# Patient Record
Sex: FEMALE | Employment: OTHER | URBAN - METROPOLITAN AREA
[De-identification: names, ages, dates, MRNs, and addresses within clinical notes are randomized per-mention and may not be internally consistent; named-entity substitution may affect disease eponyms.]

---

## 2021-12-07 ENCOUNTER — LAB VISIT (OUTPATIENT)
Dept: LAB | Facility: HOSPITAL | Age: 81
End: 2021-12-07
Attending: FAMILY MEDICINE
Payer: MEDICARE

## 2021-12-07 ENCOUNTER — OFFICE VISIT (OUTPATIENT)
Dept: FAMILY MEDICINE | Facility: CLINIC | Age: 81
End: 2021-12-07
Payer: MEDICARE

## 2021-12-07 VITALS
WEIGHT: 181.88 LBS | RESPIRATION RATE: 18 BRPM | BODY MASS INDEX: 33.47 KG/M2 | OXYGEN SATURATION: 96 % | HEIGHT: 62 IN | TEMPERATURE: 98 F | SYSTOLIC BLOOD PRESSURE: 128 MMHG | HEART RATE: 106 BPM | DIASTOLIC BLOOD PRESSURE: 74 MMHG

## 2021-12-07 DIAGNOSIS — Z11.59 NEED FOR HEPATITIS C SCREENING TEST: ICD-10-CM

## 2021-12-07 DIAGNOSIS — M25.512 CHRONIC LEFT SHOULDER PAIN: Primary | ICD-10-CM

## 2021-12-07 DIAGNOSIS — E03.9 ACQUIRED HYPOTHYROIDISM: ICD-10-CM

## 2021-12-07 DIAGNOSIS — K21.9 GASTROESOPHAGEAL REFLUX DISEASE WITHOUT ESOPHAGITIS: ICD-10-CM

## 2021-12-07 DIAGNOSIS — Z12.31 ENCOUNTER FOR SCREENING MAMMOGRAM FOR MALIGNANT NEOPLASM OF BREAST: ICD-10-CM

## 2021-12-07 DIAGNOSIS — R35.1 NOCTURIA: ICD-10-CM

## 2021-12-07 DIAGNOSIS — F41.1 GENERALIZED ANXIETY DISORDER: ICD-10-CM

## 2021-12-07 DIAGNOSIS — Z23 NEED FOR VACCINATION: ICD-10-CM

## 2021-12-07 DIAGNOSIS — F33.1 MODERATE EPISODE OF RECURRENT MAJOR DEPRESSIVE DISORDER: ICD-10-CM

## 2021-12-07 DIAGNOSIS — Z78.0 POSTMENOPAUSAL: ICD-10-CM

## 2021-12-07 DIAGNOSIS — E78.2 MIXED HYPERLIPIDEMIA: ICD-10-CM

## 2021-12-07 DIAGNOSIS — G89.29 CHRONIC LEFT SHOULDER PAIN: Primary | ICD-10-CM

## 2021-12-07 DIAGNOSIS — G47.00 INSOMNIA, UNSPECIFIED TYPE: ICD-10-CM

## 2021-12-07 LAB
BILIRUB UR QL STRIP: NEGATIVE
CLARITY UR: CLEAR
COLOR UR: YELLOW
GLUCOSE UR QL STRIP: NEGATIVE
HGB UR QL STRIP: NEGATIVE
HYALINE CASTS #/AREA URNS LPF: 4 /LPF
KETONES UR QL STRIP: NEGATIVE
LEUKOCYTE ESTERASE UR QL STRIP: ABNORMAL
MICROSCOPIC COMMENT: ABNORMAL
NITRITE UR QL STRIP: NEGATIVE
NON-SQ EPI CELLS #/AREA URNS HPF: 0 /HPF
PH UR STRIP: 7 [PH] (ref 5–8)
PROT UR QL STRIP: NEGATIVE
RBC #/AREA URNS HPF: 2 /HPF (ref 0–4)
SP GR UR STRIP: 1.01 (ref 1–1.03)
SQUAMOUS #/AREA URNS HPF: 1 /HPF
URN SPEC COLLECT METH UR: ABNORMAL
UROBILINOGEN UR STRIP-ACNC: NEGATIVE EU/DL
WBC #/AREA URNS HPF: 4 /HPF (ref 0–5)
WBC CLUMPS URNS QL MICRO: ABNORMAL

## 2021-12-07 PROCEDURE — 3288F FALL RISK ASSESSMENT DOCD: CPT | Mod: CPTII,S$GLB,, | Performed by: FAMILY MEDICINE

## 2021-12-07 PROCEDURE — G0008 FLU VACCINE - QUADRIVALENT - ADJUVANTED: ICD-10-PCS | Mod: S$GLB,,, | Performed by: FAMILY MEDICINE

## 2021-12-07 PROCEDURE — 99204 OFFICE O/P NEW MOD 45 MIN: CPT | Mod: S$GLB,,, | Performed by: FAMILY MEDICINE

## 2021-12-07 PROCEDURE — 3078F DIAST BP <80 MM HG: CPT | Mod: CPTII,S$GLB,, | Performed by: FAMILY MEDICINE

## 2021-12-07 PROCEDURE — 1101F PR PT FALLS ASSESS DOC 0-1 FALLS W/OUT INJ PAST YR: ICD-10-PCS | Mod: CPTII,S$GLB,, | Performed by: FAMILY MEDICINE

## 2021-12-07 PROCEDURE — G0008 ADMIN INFLUENZA VIRUS VAC: HCPCS | Mod: S$GLB,,, | Performed by: FAMILY MEDICINE

## 2021-12-07 PROCEDURE — 99999 PR PBB SHADOW E&M-EST. PATIENT-LVL IV: ICD-10-PCS | Mod: PBBFAC,,, | Performed by: FAMILY MEDICINE

## 2021-12-07 PROCEDURE — 81000 URINALYSIS NONAUTO W/SCOPE: CPT | Performed by: FAMILY MEDICINE

## 2021-12-07 PROCEDURE — 87086 URINE CULTURE/COLONY COUNT: CPT | Performed by: FAMILY MEDICINE

## 2021-12-07 PROCEDURE — 99204 PR OFFICE/OUTPT VISIT, NEW, LEVL IV, 45-59 MIN: ICD-10-PCS | Mod: S$GLB,,, | Performed by: FAMILY MEDICINE

## 2021-12-07 PROCEDURE — 90694 VACC AIIV4 NO PRSRV 0.5ML IM: CPT | Mod: S$GLB,,, | Performed by: FAMILY MEDICINE

## 2021-12-07 PROCEDURE — 3288F PR FALLS RISK ASSESSMENT DOCUMENTED: ICD-10-PCS | Mod: CPTII,S$GLB,, | Performed by: FAMILY MEDICINE

## 2021-12-07 PROCEDURE — 90694 FLU VACCINE - QUADRIVALENT - ADJUVANTED: ICD-10-PCS | Mod: S$GLB,,, | Performed by: FAMILY MEDICINE

## 2021-12-07 PROCEDURE — 1101F PT FALLS ASSESS-DOCD LE1/YR: CPT | Mod: CPTII,S$GLB,, | Performed by: FAMILY MEDICINE

## 2021-12-07 PROCEDURE — 3074F SYST BP LT 130 MM HG: CPT | Mod: CPTII,S$GLB,, | Performed by: FAMILY MEDICINE

## 2021-12-07 PROCEDURE — 3078F PR MOST RECENT DIASTOLIC BLOOD PRESSURE < 80 MM HG: ICD-10-PCS | Mod: CPTII,S$GLB,, | Performed by: FAMILY MEDICINE

## 2021-12-07 PROCEDURE — 99999 PR PBB SHADOW E&M-EST. PATIENT-LVL IV: CPT | Mod: PBBFAC,,, | Performed by: FAMILY MEDICINE

## 2021-12-07 PROCEDURE — 3074F PR MOST RECENT SYSTOLIC BLOOD PRESSURE < 130 MM HG: ICD-10-PCS | Mod: CPTII,S$GLB,, | Performed by: FAMILY MEDICINE

## 2021-12-07 RX ORDER — DICLOFENAC SODIUM 10 MG/G
GEL TOPICAL
Qty: 100 G | Refills: 5 | Status: SHIPPED | OUTPATIENT
Start: 2021-12-07 | End: 2022-09-21 | Stop reason: SDUPTHER

## 2021-12-07 RX ORDER — LEVOTHYROXINE SODIUM 100 UG/1
100 TABLET ORAL
COMMUNITY
End: 2021-12-28 | Stop reason: SDUPTHER

## 2021-12-07 RX ORDER — ATORVASTATIN CALCIUM 10 MG/1
10 TABLET, FILM COATED ORAL DAILY
COMMUNITY
End: 2021-12-28 | Stop reason: SDUPTHER

## 2021-12-07 RX ORDER — ALPRAZOLAM 0.5 MG/1
0.5 TABLET ORAL 2 TIMES DAILY
COMMUNITY
End: 2021-12-28

## 2021-12-07 RX ORDER — NORTRIPTYLINE HYDROCHLORIDE 50 MG/1
50 CAPSULE ORAL NIGHTLY
COMMUNITY
End: 2021-12-28 | Stop reason: SDUPTHER

## 2021-12-07 RX ORDER — MIRTAZAPINE 7.5 MG/1
7.5 TABLET, FILM COATED ORAL NIGHTLY
Qty: 30 TABLET | Refills: 1 | Status: SHIPPED | OUTPATIENT
Start: 2021-12-07 | End: 2021-12-28 | Stop reason: SDUPTHER

## 2021-12-07 RX ORDER — OMEPRAZOLE 40 MG/1
40 CAPSULE, DELAYED RELEASE ORAL DAILY
COMMUNITY
End: 2021-12-28 | Stop reason: SDUPTHER

## 2021-12-07 RX ORDER — DULOXETIN HYDROCHLORIDE 30 MG/1
30 CAPSULE, DELAYED RELEASE ORAL DAILY
COMMUNITY
End: 2021-12-28 | Stop reason: SDUPTHER

## 2021-12-07 NOTE — PROGRESS NOTES
Patient given flu vaccine to left deltoid, no complaints or reactions noted. Vis given 8/6/21 to patient (Pashto)

## 2021-12-09 ENCOUNTER — HOSPITAL ENCOUNTER (OUTPATIENT)
Dept: RADIOLOGY | Facility: CLINIC | Age: 81
Discharge: HOME | End: 2021-12-09
Attending: FAMILY MEDICINE
Payer: MEDICARE

## 2021-12-09 DIAGNOSIS — Z78.0 POSTMENOPAUSAL: ICD-10-CM

## 2021-12-09 LAB — BACTERIA UR CULT: NORMAL

## 2021-12-09 PROCEDURE — 77080 DEXA BONE DENSITY SPINE HIP: ICD-10-PCS | Mod: 26,,, | Performed by: INTERNAL MEDICINE

## 2021-12-09 PROCEDURE — 77080 DXA BONE DENSITY AXIAL: CPT | Mod: 26,,, | Performed by: INTERNAL MEDICINE

## 2021-12-09 PROCEDURE — 77080 DXA BONE DENSITY AXIAL: CPT | Mod: TC,PO

## 2021-12-28 ENCOUNTER — OFFICE VISIT (OUTPATIENT)
Dept: FAMILY MEDICINE | Facility: CLINIC | Age: 81
End: 2021-12-28
Payer: MEDICARE

## 2021-12-28 VITALS
BODY MASS INDEX: 33.71 KG/M2 | SYSTOLIC BLOOD PRESSURE: 94 MMHG | OXYGEN SATURATION: 95 % | HEIGHT: 62 IN | TEMPERATURE: 98 F | WEIGHT: 183.19 LBS | HEART RATE: 95 BPM | DIASTOLIC BLOOD PRESSURE: 62 MMHG | RESPIRATION RATE: 18 BRPM

## 2021-12-28 DIAGNOSIS — M75.102 TEAR OF LEFT ROTATOR CUFF, UNSPECIFIED TEAR EXTENT, UNSPECIFIED WHETHER TRAUMATIC: ICD-10-CM

## 2021-12-28 DIAGNOSIS — G47.00 INSOMNIA, UNSPECIFIED TYPE: ICD-10-CM

## 2021-12-28 DIAGNOSIS — F33.1 MODERATE EPISODE OF RECURRENT MAJOR DEPRESSIVE DISORDER: ICD-10-CM

## 2021-12-28 DIAGNOSIS — M85.89 OSTEOPENIA OF MULTIPLE SITES: Primary | ICD-10-CM

## 2021-12-28 DIAGNOSIS — F41.1 GENERALIZED ANXIETY DISORDER: ICD-10-CM

## 2021-12-28 DIAGNOSIS — E03.9 ACQUIRED HYPOTHYROIDISM: ICD-10-CM

## 2021-12-28 DIAGNOSIS — K21.9 GASTROESOPHAGEAL REFLUX DISEASE, UNSPECIFIED WHETHER ESOPHAGITIS PRESENT: ICD-10-CM

## 2021-12-28 DIAGNOSIS — E78.5 DYSLIPIDEMIA: ICD-10-CM

## 2021-12-28 DIAGNOSIS — M19.012 ARTHROSIS OF LEFT SHOULDER: ICD-10-CM

## 2021-12-28 DIAGNOSIS — L85.3 DRY SKIN: ICD-10-CM

## 2021-12-28 PROCEDURE — 3074F PR MOST RECENT SYSTOLIC BLOOD PRESSURE < 130 MM HG: ICD-10-PCS | Mod: CPTII,S$GLB,, | Performed by: FAMILY MEDICINE

## 2021-12-28 PROCEDURE — 3288F PR FALLS RISK ASSESSMENT DOCUMENTED: ICD-10-PCS | Mod: CPTII,S$GLB,, | Performed by: FAMILY MEDICINE

## 2021-12-28 PROCEDURE — 1101F PT FALLS ASSESS-DOCD LE1/YR: CPT | Mod: CPTII,S$GLB,, | Performed by: FAMILY MEDICINE

## 2021-12-28 PROCEDURE — 99999 PR PBB SHADOW E&M-EST. PATIENT-LVL IV: ICD-10-PCS | Mod: PBBFAC,,, | Performed by: FAMILY MEDICINE

## 2021-12-28 PROCEDURE — 99214 PR OFFICE/OUTPT VISIT, EST, LEVL IV, 30-39 MIN: ICD-10-PCS | Mod: S$GLB,,, | Performed by: FAMILY MEDICINE

## 2021-12-28 PROCEDURE — 3078F PR MOST RECENT DIASTOLIC BLOOD PRESSURE < 80 MM HG: ICD-10-PCS | Mod: CPTII,S$GLB,, | Performed by: FAMILY MEDICINE

## 2021-12-28 PROCEDURE — 99214 OFFICE O/P EST MOD 30 MIN: CPT | Mod: S$GLB,,, | Performed by: FAMILY MEDICINE

## 2021-12-28 PROCEDURE — 3074F SYST BP LT 130 MM HG: CPT | Mod: CPTII,S$GLB,, | Performed by: FAMILY MEDICINE

## 2021-12-28 PROCEDURE — 99999 PR PBB SHADOW E&M-EST. PATIENT-LVL IV: CPT | Mod: PBBFAC,,, | Performed by: FAMILY MEDICINE

## 2021-12-28 PROCEDURE — 1159F MED LIST DOCD IN RCRD: CPT | Mod: CPTII,S$GLB,, | Performed by: FAMILY MEDICINE

## 2021-12-28 PROCEDURE — 3078F DIAST BP <80 MM HG: CPT | Mod: CPTII,S$GLB,, | Performed by: FAMILY MEDICINE

## 2021-12-28 PROCEDURE — 1101F PR PT FALLS ASSESS DOC 0-1 FALLS W/OUT INJ PAST YR: ICD-10-PCS | Mod: CPTII,S$GLB,, | Performed by: FAMILY MEDICINE

## 2021-12-28 PROCEDURE — 1159F PR MEDICATION LIST DOCUMENTED IN MEDICAL RECORD: ICD-10-PCS | Mod: CPTII,S$GLB,, | Performed by: FAMILY MEDICINE

## 2021-12-28 PROCEDURE — 3288F FALL RISK ASSESSMENT DOCD: CPT | Mod: CPTII,S$GLB,, | Performed by: FAMILY MEDICINE

## 2021-12-28 PROCEDURE — 1160F RVW MEDS BY RX/DR IN RCRD: CPT | Mod: CPTII,S$GLB,, | Performed by: FAMILY MEDICINE

## 2021-12-28 PROCEDURE — 1160F PR REVIEW ALL MEDS BY PRESCRIBER/CLIN PHARMACIST DOCUMENTED: ICD-10-PCS | Mod: CPTII,S$GLB,, | Performed by: FAMILY MEDICINE

## 2021-12-28 RX ORDER — CALCIUM CARBONATE/VITAMIN D3 600 MG-20
1 TABLET,CHEWABLE ORAL ONCE
Qty: 180 TABLET | Refills: 3 | Status: SHIPPED | OUTPATIENT
Start: 2021-12-28 | End: 2021-12-28

## 2021-12-28 RX ORDER — ATORVASTATIN CALCIUM 10 MG/1
10 TABLET, FILM COATED ORAL DAILY
Qty: 90 TABLET | Refills: 1 | Status: SHIPPED | OUTPATIENT
Start: 2021-12-28 | End: 2022-03-28 | Stop reason: SDUPTHER

## 2021-12-28 RX ORDER — DULOXETIN HYDROCHLORIDE 30 MG/1
30 CAPSULE, DELAYED RELEASE ORAL DAILY
Qty: 90 CAPSULE | Refills: 0 | Status: SHIPPED | OUTPATIENT
Start: 2021-12-28 | End: 2022-03-28 | Stop reason: SDUPTHER

## 2021-12-28 RX ORDER — MIRTAZAPINE 7.5 MG/1
7.5 TABLET, FILM COATED ORAL NIGHTLY
Qty: 30 TABLET | Refills: 1 | Status: SHIPPED | OUTPATIENT
Start: 2021-12-28 | End: 2022-03-28 | Stop reason: SDUPTHER

## 2021-12-28 RX ORDER — OMEPRAZOLE 40 MG/1
40 CAPSULE, DELAYED RELEASE ORAL DAILY
Qty: 90 CAPSULE | Refills: 0 | Status: SHIPPED | OUTPATIENT
Start: 2021-12-28 | End: 2022-03-28 | Stop reason: SDUPTHER

## 2021-12-28 RX ORDER — LEVOTHYROXINE SODIUM 100 UG/1
100 TABLET ORAL
Qty: 90 TABLET | Refills: 0 | Status: SHIPPED | OUTPATIENT
Start: 2021-12-28 | End: 2022-03-28 | Stop reason: SDUPTHER

## 2021-12-28 RX ORDER — AMMONIUM LACTATE 12 G/100G
LOTION TOPICAL
Qty: 396 G | Refills: 11 | Status: SHIPPED | OUTPATIENT
Start: 2021-12-28 | End: 2022-09-21 | Stop reason: SDUPTHER

## 2021-12-28 RX ORDER — NORTRIPTYLINE HYDROCHLORIDE 50 MG/1
50 CAPSULE ORAL NIGHTLY
Qty: 90 CAPSULE | Refills: 1 | Status: SHIPPED | OUTPATIENT
Start: 2021-12-28 | End: 2022-03-28 | Stop reason: SDUPTHER

## 2021-12-29 ENCOUNTER — HOSPITAL ENCOUNTER (OUTPATIENT)
Dept: RADIOLOGY | Facility: HOSPITAL | Age: 81
Discharge: HOME | End: 2021-12-29
Attending: FAMILY MEDICINE
Payer: MEDICARE

## 2021-12-29 DIAGNOSIS — Z12.31 ENCOUNTER FOR SCREENING MAMMOGRAM FOR MALIGNANT NEOPLASM OF BREAST: ICD-10-CM

## 2021-12-29 PROCEDURE — 77063 BREAST TOMOSYNTHESIS BI: CPT | Mod: 26,,, | Performed by: RADIOLOGY

## 2021-12-29 PROCEDURE — 77067 SCR MAMMO BI INCL CAD: CPT | Mod: 26,,, | Performed by: RADIOLOGY

## 2021-12-29 PROCEDURE — 77063 MAMMO DIGITAL SCREENING BILAT WITH TOMO: ICD-10-PCS | Mod: 26,,, | Performed by: RADIOLOGY

## 2021-12-29 PROCEDURE — 77067 MAMMO DIGITAL SCREENING BILAT WITH TOMO: ICD-10-PCS | Mod: 26,,, | Performed by: RADIOLOGY

## 2021-12-29 PROCEDURE — 77067 SCR MAMMO BI INCL CAD: CPT | Mod: TC,PO

## 2021-12-30 ENCOUNTER — TELEPHONE (OUTPATIENT)
Dept: FAMILY MEDICINE | Facility: CLINIC | Age: 81
End: 2021-12-30
Payer: MEDICARE

## 2022-01-02 NOTE — PROGRESS NOTES
Subjective:       Patient ID: Thad Maki is a 81 y.o. female.    Chief Complaint: Establish Care    Shoulder Pain   The pain is present in the left shoulder. This is a new problem. The current episode started more than 1 month ago. There has been no history of extremity trauma. The problem occurs constantly. The problem has been unchanged. The quality of the pain is described as aching. The pain is at a severity of 9/10. The pain is severe. The symptoms are aggravated by activity and lying down. She has tried nothing for the symptoms. There is no history of Injuries to Extremity.   Anxiety  Presents for initial visit. Symptoms include decreased concentration, depressed mood, excessive worry, insomnia, irritability, nervous/anxious behavior and palpitations. Patient reports no chest pain, feeling of choking, shortness of breath or suicidal ideas. Symptoms occur most days. The severity of symptoms is interfering with daily activities. Nothing aggravates the symptoms. The quality of sleep is poor. Nighttime awakenings: several.     Her past medical history is significant for anxiety/panic attacks. Treatments tried: Cymbalta. Compliance with prior treatments: not taking consistently    Depression  Visit Type: initial  Onset of symptoms: more than 1 year ago  Progression since onset: unchanged  Patient presents with the following symptoms: decreased concentration, depressed mood, excessive worry, fatigue, insomnia, irritability, nervousness/anxiety and palpitations.  Patient is not experiencing: shortness of breath, suicidal ideas, suicidal planning, thoughts of death, weight gain and weight loss.  Frequency of symptoms: most days   Sleep quality: poor  Nighttime awakenings: several  Patient has a history of: anxiety/panic attacks and depression  Treatment tried: non-SSRI antidepressants  Compliance with treatment: poor  Past compliance problems: difficulty understanding directions      Hyperlipidemia  This is a  chronic problem. Pertinent negatives include no chest pain or shortness of breath. Current antihyperlipidemic treatment includes statins. Compliance problems include psychosocial issues.    Thyroid Problem  Presents for initial visit. Symptoms include anxiety, depressed mood and palpitations. Patient reports no weight gain or weight loss. Her past medical history is significant for hyperlipidemia.   Gastroesophageal Reflux  She reports no chest pain. The current episode started more than 1 year ago. The problem occurs frequently. Pertinent negatives include no weight loss. She has tried a PPI for the symptoms.     Review of Systems   Constitutional: Positive for irritability. Negative for weight gain and weight loss.   Respiratory: Negative for shortness of breath.    Cardiovascular: Positive for palpitations. Negative for chest pain.   Psychiatric/Behavioral: Positive for decreased concentration and depression. Negative for suicidal ideas. The patient is nervous/anxious and has insomnia.          Objective:      Physical Exam  Vitals reviewed.   Constitutional:       General: She is not in acute distress.  HENT:      Head: Normocephalic and atraumatic.      Right Ear: Ear canal and external ear normal.      Left Ear: Ear canal and external ear normal.      Nose: Nose normal.      Mouth/Throat:      Mouth: Mucous membranes are moist.      Pharynx: No oropharyngeal exudate or posterior oropharyngeal erythema.   Eyes:      Extraocular Movements: Extraocular movements intact.      Conjunctiva/sclera: Conjunctivae normal.      Pupils: Pupils are equal, round, and reactive to light.   Cardiovascular:      Rate and Rhythm: Regular rhythm. Tachycardia present.      Pulses: Normal pulses.      Heart sounds: Normal heart sounds.   Pulmonary:      Effort: Pulmonary effort is normal. No respiratory distress.      Breath sounds: No wheezing or rales.   Abdominal:      General: Abdomen is flat. Bowel sounds are normal. There is  no distension.      Palpations: Abdomen is soft.      Tenderness: There is no abdominal tenderness. There is no guarding.   Musculoskeletal:      Cervical back: Normal range of motion. No rigidity or tenderness.   Lymphadenopathy:      Cervical: No cervical adenopathy.   Skin:     General: Skin is warm.      Capillary Refill: Capillary refill takes less than 2 seconds.   Neurological:      Mental Status: She is alert and oriented to person, place, and time.      Cranial Nerves: No cranial nerve deficit.      Sensory: No sensory deficit.   Psychiatric:         Mood and Affect: Mood is anxious and depressed. Affect is not inappropriate.         Behavior: Behavior normal.         Thought Content: Thought content normal. Thought content does not include homicidal or suicidal ideation.         Assessment:       Problem List Items Addressed This Visit        Psychiatric    Generalized anxiety disorder    Moderate episode of recurrent major depressive disorder       Cardiac/Vascular    Hyperlipidemia    Relevant Orders    Comprehensive Metabolic Panel (Completed)    Lipid Panel (Completed)    CBC Auto Differential (Completed)       Endocrine    Hypothyroidism    Relevant Orders    CBC Auto Differential (Completed)    TSH (Completed)    T4, Free (Completed)       GI    GERD (gastroesophageal reflux disease)       Other    Insomnia      Other Visit Diagnoses     Chronic left shoulder pain    -  Primary    Relevant Medications    diclofenac sodium (VOLTAREN) 1 % Gel    Other Relevant Orders    X-Ray Shoulder Trauma 3 view Left (Completed)    Nocturia        Relevant Orders    Urinalysis (Completed)    Urine culture (Completed)    Postmenopausal        Relevant Orders    DXA Bone Density Spine And Hip (Completed)    Need for vaccination        Relevant Orders    Influenza (FLUAD) - Quadrivalent (Adjuvanted) *Preferred* (65+) (PF) (Completed)    Need for hepatitis C screening test        Relevant Orders    Hepatitis C Antibody  (Completed)    Encounter for screening mammogram for malignant neoplasm of breast        Relevant Orders    Mammo Digital Screening Bilat w/ [facility] (Completed)          Plan:       Thad was seen today for establish care.    Diagnoses and all orders for this visit:    Chronic left shoulder pain  -     X-Ray Shoulder Trauma 3 view Left; Future  -     diclofenac sodium (VOLTAREN) 1 % Gel; Apply 2 grams to left shoulder QID-PRN for pain  Check Xray  Trial of Voltaren gel  Follow up in 2 weeks    Generalized anxiety disorder/Moderate episode of recurrent major depressive disorder/Insomnia, unspecified type  -     mirtazapine (REMERON) 7.5 MG Tab; Take 1 tablet (7.5 mg total) by mouth every evening.  Discussed correct way of taking Cymbalta.  Will add Remeron and re-evaluate    Mixed hyperlipidemia  -     Comprehensive Metabolic Panel; Future  -     Lipid Panel; Future  -     CBC Auto Differential; Future  Check lipid panel.    Acquired hypothyroidism  -     CBC Auto Differential; Future  -     TSH; Future  -     T4, Free; Future  Check patient's thyroid function.    Nocturia  -     Urinalysis; Future  -     Urine culture; Future  Your studies collected.    Gastroesophageal reflux disease without esophagitis  Dietary recommendations reviewed    Postmenopausal  -     DXA Bone Density Spine And Hip; Future  It was scheduled for bone density.    Need for vaccination  -     Influenza (FLUAD) - Quadrivalent (Adjuvanted) *Preferred* (65+) (PF)    Need for hepatitis C screening test  -     Hepatitis C Antibody; Future    Encounter for screening mammogram for malignant neoplasm of breast  -     Mammo Digital Screening Bilat w/ [facility]; Future  Breast cancer screening ordered

## 2022-01-02 NOTE — PROGRESS NOTES
Subjective:       Patient ID: Thad Maki is a 81 y.o. female.    Chief Complaint: Results    HPI   81-year-old female comes in for follow-up on shoulder pain, hypertension, dyslipidemia, and hypothyroidism.  She is taking her medications as she was instructed to at the last visit.  She continues to have daily left shoulder pain.  She states that there is nothing in particular improves it.  She feels a daily.  Is worsened with activity.  The there is no swelling.  She denies any specific trauma.  The patient also reports itchy dry skin generalized.  This has been going on for over a year.  It is worsened when a the temperature is cold.  She reports that shins she has been taking the Cymbalta and mirtazapine her anxiety and depression are improving.  She also reports improvement with insomnia.    Review of Systems   Constitutional: Negative for unexpected weight change.   HENT: Negative for ear pain and sore throat.    Eyes: Negative for visual disturbance.   Respiratory: Negative for shortness of breath.    Cardiovascular: Negative for chest pain.   Gastrointestinal: Negative for abdominal pain and blood in stool.   Genitourinary: Negative for dysuria and frequency.   Musculoskeletal: Positive for arthralgias.   Integumentary:  Negative for rash.   Neurological: Negative for weakness, numbness and headaches.   Hematological: Negative for adenopathy.   Psychiatric/Behavioral: Negative for suicidal ideas.         Objective:      Physical Exam  Vitals reviewed.   Constitutional:       Appearance: She is well-developed and well-nourished.   HENT:      Head: Normocephalic and atraumatic.      Right Ear: External ear normal.      Left Ear: External ear normal.      Nose: Nose normal.      Mouth/Throat:      Mouth: Oropharynx is clear and moist.      Pharynx: No oropharyngeal exudate.   Eyes:      General:         Right eye: No discharge.         Left eye: No discharge.      Extraocular Movements: EOM normal.       Conjunctiva/sclera: Conjunctivae normal.      Pupils: Pupils are equal, round, and reactive to light.   Neck:      Trachea: No tracheal deviation.   Cardiovascular:      Rate and Rhythm: Normal rate and regular rhythm.      Pulses: Intact distal pulses.      Heart sounds: Normal heart sounds.   Pulmonary:      Effort: Pulmonary effort is normal.      Breath sounds: Normal breath sounds. No wheezing or rales.   Abdominal:      General: Bowel sounds are normal.      Palpations: Abdomen is soft. Abdomen is not rigid. There is no mass.      Tenderness: There is no abdominal tenderness. There is no CVA tenderness or guarding.   Musculoskeletal:      Cervical back: Normal range of motion and neck supple.   Lymphadenopathy:      Cervical: No cervical adenopathy.   Neurological:      Mental Status: She is oriented to person, place, and time.      Sensory: No sensory deficit.      Motor: No atrophy.      Gait: Gait normal.      Deep Tendon Reflexes: Strength normal.      Reflex Scores:       Patellar reflexes are 2+ on the right side and 2+ on the left side.  Psychiatric:         Mood and Affect: Mood and affect normal.         Lab Visit on 12/07/2021   Component Date Value Ref Range Status    Specimen UA 12/07/2021 Urine, Clean Catch   Final    Color, UA 12/07/2021 Yellow  Yellow, Straw, Antonia Final    Appearance, UA 12/07/2021 Clear  Clear Final    pH, UA 12/07/2021 7.0  5.0 - 8.0 Final    Specific [place], UA 12/07/2021 1.015  1.005 - 1.030 Final    Protein, UA 12/07/2021 Negative  Negative Final    Comment: Recommend a 24 hour urine protein or a urine   protein/creatinine ratio if globulin induced proteinuria is  clinically suspected.      Glucose, UA 12/07/2021 Negative  Negative Final    Ketones, UA 12/07/2021 Negative  Negative Final    Bilirubin (UA) 12/07/2021 Negative  Negative Final    Occult Blood UA 12/07/2021 Negative  Negative Final    Nitrite, UA 12/07/2021 Negative  Negative Final     Urobilinogen, UA 12/07/2021 Negative  <2.0 EU/dL Final    Leukocytes, UA 12/07/2021 1+* Negative Final    Urine Culture, Routine 12/07/2021 No significant growth   Final    RBC, UA 12/07/2021 2  0 - 4 /hpf Final    WBC, UA 12/07/2021 4  0 - 5 /hpf Final    WBC Clumps, UA 12/07/2021 Rare  None-Rare Final    Squam Epithel, UA 12/07/2021 1  /hpf Final    Non-Squam Epith 12/07/2021 0  <1/hpf /hpf Final    Hyaline Casts, UA 12/07/2021 4* 0-1/lpf /lpf Final    Microscopic Comment 12/07/2021 SEE COMMENT   Final    Comment: Other formed elements not mentioned in the report are not   present in the microscopic examination.      Lab Visit on 12/07/2021   Component Date Value Ref Range Status    Sodium 12/07/2021 143  136 - 145 mmol/L Final    Potassium 12/07/2021 4.6  3.5 - 5.1 mmol/L Final    Chloride 12/07/2021 105  95 - 110 mmol/L Final    CO2 12/07/2021 28  23 - 29 mmol/L Final    Glucose 12/07/2021 96  70 - 110 mg/dL Final    BUN 12/07/2021 18  8 - 23 mg/dL Final    Creatinine 12/07/2021 0.8  0.5 - 1.4 mg/dL Final    Calcium 12/07/2021 9.7  8.7 - 10.5 mg/dL Final    Total Protein 12/07/2021 7.1  6.0 - 8.4 g/dL Final    Albumin 12/07/2021 4.1  3.5 - 5.2 g/dL Final    Total Bilirubin 12/07/2021 0.2  0.1 - 1.0 mg/dL Final    Comment: For infants and newborns, interpretation of results should be based  on gestational age, weight and in agreement with clinical  observations.    Premature Infant recommended reference ranges:  Up to 24 hours.............<8.0 mg/dL  Up to 48 hours............<12.0 mg/dL  3-5 days..................<15.0 mg/dL  6-29 days.................<15.0 mg/dL      Alkaline Phosphatase 12/07/2021 87  55 - 135 U/L Final    AST 12/07/2021 23  10 - 40 U/L Final    ALT 12/07/2021 15  10 - 44 U/L Final    Anion Gap 12/07/2021 10  8 - 16 mmol/L Final    eGFR if African American 12/07/2021 >60  >60 mL/min/1.73 m^2 Final    eGFR if non African American 12/07/2021 >60  >60 mL/min/1.73 m^2  Final    Comment: Calculation used to obtain the estimated glomerular filtration  rate (eGFR) is the CKD-EPI equation.       Cholesterol 12/07/2021 152  120 - 199 mg/dL Final    Comment: The National Cholesterol Education Program (NCEP) has set the  following guidelines (reference ranges) for Cholesterol:  Optimal.....................<200 mg/dL  Borderline High.............200-239 mg/dL  High........................> or = 240 mg/dL      Triglycerides 12/07/2021 80  30 - 150 mg/dL Final    Comment: The National Cholesterol Education Program (NCEP) has set the  following guidelines (reference values) for triglycerides:  Normal......................<150 mg/dL  Borderline High.............150-199 mg/dL  High........................200-499 mg/dL      HDL 12/07/2021 53  40 - 75 mg/dL Final    Comment: The National Cholesterol Education Program (NCEP) has set the  following guidelines (reference values) for HDL Cholesterol:  Low...............<40 mg/dL  Optimal...........>60 mg/dL      LDL Cholesterol 12/07/2021 83.0  63.0 - 159.0 mg/dL Final    Comment: The National Cholesterol Education Program (NCEP) has set the  following guidelines (reference values) for LDL Cholesterol:  Optimal.......................<130 mg/dL  Borderline High...............130-159 mg/dL  High..........................160-189 mg/dL  Very High.....................>190 mg/dL      HDL/Cholesterol Ratio 12/07/2021 34.9  20.0 - 50.0 % Final    Total Cholesterol/HDL Ratio 12/07/2021 2.9  2.0 - 5.0 Final    Non-HDL Cholesterol 12/07/2021 99  mg/dL Final    Comment: Risk category and Non-HDL cholesterol goals:  Coronary heart disease (CHD)or equivalent (10-year risk of CHD >20%):  Non-HDL cholesterol goal     <130 mg/dL  Two or more CHD risk factors and 10-year risk of CHD <= 20%:  Non-HDL cholesterol goal     <160 mg/dL  0 to 1 CHD risk factor:  Non-HDL cholesterol goal     <190 mg/dL      Hepatitis C Ab 12/07/2021 Negative  Negative Final    WBC  12/07/2021 8.15  3.90 - 12.70 K/uL Final    RBC 12/07/2021 4.89  4.00 - 5.40 M/uL Final    Hemoglobin 12/07/2021 13.3  12.0 - 16.0 g/dL Final    Hematocrit 12/07/2021 42.2  37.0 - 48.5 % Final    MCV 12/07/2021 86  82 - 98 fL Final    MCH 12/07/2021 27.2  27.0 - 31.0 pg Final    MCHC 12/07/2021 31.5* 32.0 - 36.0 g/dL Final    RDW 12/07/2021 15.3* 11.5 - 14.5 % Final    Platelets 12/07/2021 209  150 - 450 K/uL Final    MPV 12/07/2021 12.1  9.2 - 12.9 fL Final    Immature Granulocytes 12/07/2021 0.2  0.0 - 0.5 % Final    Gran # (ANC) 12/07/2021 4.7  1.8 - 7.7 K/uL Final    Immature Grans (Abs) 12/07/2021 0.02  0.00 - 0.04 K/uL Final    Comment: Mild elevation in immature granulocytes is non specific and   can be seen in a variety of conditions including stress response,   acute inflammation, trauma and pregnancy. Correlation with other   laboratory and clinical findings is essential.      Lymph # 12/07/2021 2.5  1.0 - 4.8 K/uL Final    Mono # 12/07/2021 0.6  0.3 - 1.0 K/uL Final    Eos # 12/07/2021 0.3  0.0 - 0.5 K/uL Final    Baso # 12/07/2021 0.02  0.00 - 0.20 K/uL Final    nRBC 12/07/2021 0  0 /100 WBC Final    Gran % 12/07/2021 57.9  38.0 - 73.0 % Final    Lymph % 12/07/2021 30.7  18.0 - 48.0 % Final    Mono % 12/07/2021 7.9  4.0 - 15.0 % Final    Eosinophil % 12/07/2021 3.1  0.0 - 8.0 % Final    Basophil % 12/07/2021 0.2  0.0 - 1.9 % Final    Differential Method 12/07/2021 Automated   Final    TSH 12/07/2021 0.272* 0.400 - 4.000 uIU/mL Final    Free T4 12/07/2021 1.02  0.71 - 1.51 ng/dL Final      Narrative & Impression  EXAMINATION:  XR SHOULDER TRAUMA 3 VIEW LEFT     CLINICAL HISTORY:  . Pain in left shoulder     COMPARISON:  None.     TECHNIQUE:  Multiple views of the left shoulder.     FINDINGS:  Generalized bone demineralization.     Glenohumeral joint: Proximal migration of the humerus touching and eroding the undersurface of the acromion with some secondary arthrosis and cystic  change within the humeral head.     AC joint: Osteoarthrosis.  Acromion type indeterminate.     Regional thoracic structures and surrounding soft tissues: Loose body projects over the spine spinoglenoid notch.     Impression:     Chronic rotator cuff tear with changes of secondary cuff arthropathy and loose body formation        Electronically signed by: Girish Short Jr  Date:                                            12/07/2021  Time:                                           15:40  Narrative & Impression  EXAMINATION:  DEXA BONE DENSITY SPINE HIP     CLINICAL HISTORY:  Asymptomatic menopausal state.  80 y/o female with no history of fractures.  She had menopausal symptoms at 68 y/o.  She is taking Vit D supplements.  She does not exercise or smoke.     TECHNIQUE:  DXA specification: [facility] A ([id number])     Bone Mineral Density scanning was performed over the hip and lumbar spine.     Review of the images confirms satisfactory positioning and technique.     COMPARISON:  No Comparisons     FINDINGS:  Lumbar spine (L1-L4):              BMD is 0.925 g/cm2, T-score is -1.1, and Z-score is 1.5.     Total hip:                                BMD is 0.815 g/cm2, T-score is -1.0, and Z-score is 1.0.     Femoral neck:                          BMD is 0.666 g/cm2, T-score is -1.6, and Z-score is 0.6.     Distal 1/3 radius:                      Not applicable     FRAX:     7.6% risk of a major osteoporotic fracture in the next 10 years.     1.8% risk of hip fracture in the next 10 years.     Impression:     *Low bone mass (Osteopenia); FRAX calculations do not support treatment as osteoporosis.     RECOMMENDATIONS:  *Daily calcium intake 2835-1917 mg, dietary sources preferred; Vitamin D 7290-3162 IU daily.  *Weight bearing exercise and fall precautions.  *Repeat BMD in 2 years.     EXPLANATION OF RESULTS:  T-score compares these results to the average bone density of a 20-29 year-old of the same  gender.     Z-score compares this result to the average bone density to people of the same age, gender, and race.     The amounts indicate the number of standard deviations above or below the mean.     * Osteoporosis is generally defined as having a T-score between less than or equal to -2.5.     * Low bone mass (osteopenia) is generally defined as having a T-score between -1.0 and -2.5.     * The normal range is generally defined as having a T-score greater than or equal to -1.0.     * Calculated FRAX scores for fracture risk prediction may not be accurate in the setting of certain clinical factors such as pharmacologic therapy for osteoporosis, prior fragility fractures, high dose glucocorticoid use.        Electronically signed by: Eda Perez MD  Date:                                            12/15/2021  Time:                                           15:15             Exam Ended: 12/09/21 13:16               Assessment:       Problem List Items Addressed This Visit        Psychiatric    Generalized anxiety disorder    Relevant Medications    mirtazapine (REMERON) 7.5 MG Tab    Moderate episode of recurrent major depressive disorder    Relevant Medications    mirtazapine (REMERON) 7.5 MG Tab    DULoxetine (CYMBALTA) 30 MG capsule       Endocrine    Hypothyroidism    Relevant Medications    levothyroxine (EUTHYROX) 100 MCG tablet    Other Relevant Orders    TSH    T4, Free    Thyroid Peroxidase Antibody       GI    GERD (gastroesophageal reflux disease)    Relevant Medications    omeprazole (PRILOSEC) 40 MG capsule       Other    Insomnia    Relevant Medications    mirtazapine (REMERON) 7.5 MG Tab    nortriptyline (PAMELOR) 50 MG capsule      Other Visit Diagnoses     Osteopenia of multiple sites    -  Primary    Tear of left rotator cuff, unspecified tear extent, unspecified whether traumatic        Relevant Orders    Ambulatory referral/consult to Orthopedics    Arthrosis of left shoulder         Relevant Orders    Ambulatory referral/consult to Orthopedics    Dyslipidemia        Relevant Medications    atorvastatin (LIPITOR) 10 MG tablet    Dry skin        Relevant Medications    ammonium lactate (LAC-HYDRIN) 12 % lotion          Plan:       Thad was seen today for results.    Diagnoses and all orders for this visit:    Osteopenia of multiple sites  -     calcium carbonate-vitamin D3 (CALTRATE 600 PLUS D) 600 mg-20 mcg (800 unit) Chew; Take 1 tablet by mouth once. for 1 dose  Commended patient to start Caltrate twice a day.  Recheck bone density in a few years.  Encouraged weight loss.  Encouraged increased physical activity.    Tear of left rotator cuff, unspecified tear extent, unspecified whether traumatic/Arthrosis of left shoulder  -     Ambulatory referral/consult to Orthopedics; Future  X-ray reviewed with patient  Will refer to Orthopedics.    Moderate episode of recurrent major depressive disorder/Generalozed anxiety disorder/Insomnia  -     mirtazapine (REMERON) 7.5 MG Tab; Take 1 tablet (7.5 mg total) by mouth every evening.  -     DULoxetine (CYMBALTA) 30 MG capsule; Take 1 capsule (30 mg total) by mouth once daily.  Patient heart improvement in symptoms.  Will continue current regimen    Dyslipidemia  -     atorvastatin (LIPITOR) 10 MG tablet; Take 1 tablet (10 mg total) by mouth once daily.  Recommended a atorvastatin 10 milligrams daily.    Acquired hypothyroidism  -     levothyroxine (EUTHYROX) 100 MCG tablet; Take 1 tablet (100 mcg total) by mouth before breakfast.  -     TSH; Future  -     T4, Free; Future  -     Thyroid Peroxidase Antibody; Future  TSH was suppressed with a normal T4.  Will recheck thyroid function in two months.    Gastroesophageal reflux disease, unspecified whether esophagitis present  -     omeprazole (PRILOSEC) 40 MG capsule; Take 1 capsule (40 mg total) by mouth once daily.  Continue Prilosec PRN    Dry skin  -     ammonium lactate (LAC-HYDRIN) 12 % lotion;  Apply topically to itchy areas after bathing once daily  Trial of LacHydrin

## 2022-03-23 ENCOUNTER — LAB VISIT (OUTPATIENT)
Dept: LAB | Facility: HOSPITAL | Age: 82
End: 2022-03-23
Attending: FAMILY MEDICINE
Payer: MEDICARE

## 2022-03-23 DIAGNOSIS — E03.9 ACQUIRED HYPOTHYROIDISM: ICD-10-CM

## 2022-03-23 LAB
T4 FREE SERPL-MCNC: 0.98 NG/DL (ref 0.71–1.51)
THYROPEROXIDASE IGG SERPL-ACNC: <6 IU/ML
TSH SERPL DL<=0.005 MIU/L-ACNC: 0.88 UIU/ML (ref 0.4–4)

## 2022-03-23 PROCEDURE — 86376 MICROSOMAL ANTIBODY EACH: CPT | Performed by: FAMILY MEDICINE

## 2022-03-23 PROCEDURE — 84439 ASSAY OF FREE THYROXINE: CPT | Performed by: FAMILY MEDICINE

## 2022-03-23 PROCEDURE — 36415 COLL VENOUS BLD VENIPUNCTURE: CPT | Mod: PN | Performed by: FAMILY MEDICINE

## 2022-03-23 PROCEDURE — 84443 ASSAY THYROID STIM HORMONE: CPT | Performed by: FAMILY MEDICINE

## 2022-03-28 ENCOUNTER — OFFICE VISIT (OUTPATIENT)
Dept: FAMILY MEDICINE | Facility: CLINIC | Age: 82
End: 2022-03-28
Payer: MEDICARE

## 2022-03-28 VITALS
BODY MASS INDEX: 35.3 KG/M2 | HEART RATE: 99 BPM | OXYGEN SATURATION: 97 % | SYSTOLIC BLOOD PRESSURE: 120 MMHG | HEIGHT: 62 IN | DIASTOLIC BLOOD PRESSURE: 66 MMHG | RESPIRATION RATE: 18 BRPM | WEIGHT: 191.81 LBS

## 2022-03-28 DIAGNOSIS — H61.21 RIGHT EAR IMPACTED CERUMEN: ICD-10-CM

## 2022-03-28 DIAGNOSIS — G89.29 CHRONIC NONINTRACTABLE HEADACHE, UNSPECIFIED HEADACHE TYPE: ICD-10-CM

## 2022-03-28 DIAGNOSIS — E78.5 DYSLIPIDEMIA: ICD-10-CM

## 2022-03-28 DIAGNOSIS — E03.9 ACQUIRED HYPOTHYROIDISM: Primary | ICD-10-CM

## 2022-03-28 DIAGNOSIS — F41.1 GENERALIZED ANXIETY DISORDER: ICD-10-CM

## 2022-03-28 DIAGNOSIS — K21.9 GASTROESOPHAGEAL REFLUX DISEASE, UNSPECIFIED WHETHER ESOPHAGITIS PRESENT: ICD-10-CM

## 2022-03-28 DIAGNOSIS — F33.1 MODERATE EPISODE OF RECURRENT MAJOR DEPRESSIVE DISORDER: ICD-10-CM

## 2022-03-28 DIAGNOSIS — G47.00 INSOMNIA, UNSPECIFIED TYPE: ICD-10-CM

## 2022-03-28 DIAGNOSIS — R51.9 CHRONIC NONINTRACTABLE HEADACHE, UNSPECIFIED HEADACHE TYPE: ICD-10-CM

## 2022-03-28 PROCEDURE — 69209 EAR CERUMEN REMOVAL: ICD-10-PCS | Mod: RT,S$GLB,, | Performed by: FAMILY MEDICINE

## 2022-03-28 PROCEDURE — 3078F PR MOST RECENT DIASTOLIC BLOOD PRESSURE < 80 MM HG: ICD-10-PCS | Mod: CPTII,S$GLB,, | Performed by: FAMILY MEDICINE

## 2022-03-28 PROCEDURE — 1160F RVW MEDS BY RX/DR IN RCRD: CPT | Mod: CPTII,S$GLB,, | Performed by: FAMILY MEDICINE

## 2022-03-28 PROCEDURE — 3074F SYST BP LT 130 MM HG: CPT | Mod: CPTII,S$GLB,, | Performed by: FAMILY MEDICINE

## 2022-03-28 PROCEDURE — 99999 PR PBB SHADOW E&M-EST. PATIENT-LVL IV: ICD-10-PCS | Mod: PBBFAC,,, | Performed by: FAMILY MEDICINE

## 2022-03-28 PROCEDURE — 99499 RISK ADDL DX/OHS AUDIT: ICD-10-PCS | Mod: S$GLB,,, | Performed by: FAMILY MEDICINE

## 2022-03-28 PROCEDURE — 99999 PR PBB SHADOW E&M-EST. PATIENT-LVL IV: CPT | Mod: PBBFAC,,, | Performed by: FAMILY MEDICINE

## 2022-03-28 PROCEDURE — 3078F DIAST BP <80 MM HG: CPT | Mod: CPTII,S$GLB,, | Performed by: FAMILY MEDICINE

## 2022-03-28 PROCEDURE — 1126F AMNT PAIN NOTED NONE PRSNT: CPT | Mod: CPTII,S$GLB,, | Performed by: FAMILY MEDICINE

## 2022-03-28 PROCEDURE — 3074F PR MOST RECENT SYSTOLIC BLOOD PRESSURE < 130 MM HG: ICD-10-PCS | Mod: CPTII,S$GLB,, | Performed by: FAMILY MEDICINE

## 2022-03-28 PROCEDURE — 69209 REMOVE IMPACTED EAR WAX UNI: CPT | Mod: RT,S$GLB,, | Performed by: FAMILY MEDICINE

## 2022-03-28 PROCEDURE — 1160F PR REVIEW ALL MEDS BY PRESCRIBER/CLIN PHARMACIST DOCUMENTED: ICD-10-PCS | Mod: CPTII,S$GLB,, | Performed by: FAMILY MEDICINE

## 2022-03-28 PROCEDURE — 99499 UNLISTED E&M SERVICE: CPT | Mod: S$GLB,,, | Performed by: FAMILY MEDICINE

## 2022-03-28 PROCEDURE — 99214 PR OFFICE/OUTPT VISIT, EST, LEVL IV, 30-39 MIN: ICD-10-PCS | Mod: 25,S$GLB,, | Performed by: FAMILY MEDICINE

## 2022-03-28 PROCEDURE — 3288F FALL RISK ASSESSMENT DOCD: CPT | Mod: CPTII,S$GLB,, | Performed by: FAMILY MEDICINE

## 2022-03-28 PROCEDURE — 1159F MED LIST DOCD IN RCRD: CPT | Mod: CPTII,S$GLB,, | Performed by: FAMILY MEDICINE

## 2022-03-28 PROCEDURE — 3288F PR FALLS RISK ASSESSMENT DOCUMENTED: ICD-10-PCS | Mod: CPTII,S$GLB,, | Performed by: FAMILY MEDICINE

## 2022-03-28 PROCEDURE — 99214 OFFICE O/P EST MOD 30 MIN: CPT | Mod: 25,S$GLB,, | Performed by: FAMILY MEDICINE

## 2022-03-28 PROCEDURE — 1101F PT FALLS ASSESS-DOCD LE1/YR: CPT | Mod: CPTII,S$GLB,, | Performed by: FAMILY MEDICINE

## 2022-03-28 PROCEDURE — 1101F PR PT FALLS ASSESS DOC 0-1 FALLS W/OUT INJ PAST YR: ICD-10-PCS | Mod: CPTII,S$GLB,, | Performed by: FAMILY MEDICINE

## 2022-03-28 PROCEDURE — 1159F PR MEDICATION LIST DOCUMENTED IN MEDICAL RECORD: ICD-10-PCS | Mod: CPTII,S$GLB,, | Performed by: FAMILY MEDICINE

## 2022-03-28 PROCEDURE — 1126F PR PAIN SEVERITY QUANTIFIED, NO PAIN PRESENT: ICD-10-PCS | Mod: CPTII,S$GLB,, | Performed by: FAMILY MEDICINE

## 2022-03-28 RX ORDER — LEVOTHYROXINE SODIUM 100 UG/1
100 TABLET ORAL
Qty: 90 TABLET | Refills: 2 | Status: SHIPPED | OUTPATIENT
Start: 2022-03-28 | End: 2022-09-21 | Stop reason: SDUPTHER

## 2022-03-28 RX ORDER — ACETAMINOPHEN 500 MG
1 TABLET ORAL DAILY
COMMUNITY
Start: 2022-02-08

## 2022-03-28 RX ORDER — MIRTAZAPINE 15 MG/1
15 TABLET, FILM COATED ORAL NIGHTLY
Qty: 90 TABLET | Refills: 2 | Status: SHIPPED | OUTPATIENT
Start: 2022-03-28 | End: 2022-09-21 | Stop reason: SDUPTHER

## 2022-03-28 RX ORDER — NORTRIPTYLINE HYDROCHLORIDE 50 MG/1
50 CAPSULE ORAL NIGHTLY
Qty: 90 CAPSULE | Refills: 2 | Status: SHIPPED | OUTPATIENT
Start: 2022-03-28 | End: 2022-09-21 | Stop reason: SDUPTHER

## 2022-03-28 RX ORDER — OMEPRAZOLE 40 MG/1
40 CAPSULE, DELAYED RELEASE ORAL DAILY
Qty: 90 CAPSULE | Refills: 2 | Status: SHIPPED | OUTPATIENT
Start: 2022-03-28 | End: 2022-09-21 | Stop reason: SDUPTHER

## 2022-03-28 RX ORDER — DULOXETIN HYDROCHLORIDE 30 MG/1
30 CAPSULE, DELAYED RELEASE ORAL DAILY
Qty: 90 CAPSULE | Refills: 2 | Status: SHIPPED | OUTPATIENT
Start: 2022-03-28 | End: 2022-09-21 | Stop reason: SDUPTHER

## 2022-03-28 RX ORDER — ATORVASTATIN CALCIUM 10 MG/1
10 TABLET, FILM COATED ORAL DAILY
Qty: 90 TABLET | Refills: 2 | Status: SHIPPED | OUTPATIENT
Start: 2022-03-28 | End: 2022-09-21 | Stop reason: SDUPTHER

## 2022-03-28 NOTE — PROGRESS NOTES
03/28/22 1518   Depression Patient Health Questionnaire (PHQ-2)   Over the last two weeks how often have you been bothered by little interest or pleasure in doing things 0   Over the last two weeks how often have you been bothered by feeling down, depressed or hopeless 0   PHQ-2 Total Score 0

## 2022-03-29 NOTE — PROGRESS NOTES
Subjective:       Patient ID: Thad Maki is a 81 y.o. female.    Chief Complaint: Thyroid Problem, Anxiety, Depression, Insomnia, and Headache    Thyroid Problem  Presents for follow-up visit. Symptoms include anxiety, depressed mood and fatigue. Patient reports no heat intolerance, hoarse voice, palpitations, tremors, visual change, weight gain or weight loss. The symptoms have been stable.   Anxiety  Presents for follow-up visit. Symptoms include decreased concentration, depressed mood, dizziness, excessive worry, insomnia and nervous/anxious behavior. Patient reports no palpitations, restlessness, shortness of breath or suicidal ideas. The quality of sleep is non-restorative. Nighttime awakenings: one to two.     Compliance with medications: review of medications shows patient has been out of Cymbalta and Nortriptyline.   Depression  Visit Type: follow-up  Patient presents with the following symptoms: decreased concentration, depressed mood, dizziness, excessive worry, insomnia and nervousness/anxiety.  Patient is not experiencing: palpitations, restlessness, shortness of breath, suicidal ideas, weight gain and weight loss.  Sleep quality: non-restorative  Nighttime awakenings: one to two  Compliance with medications:  51-75%        Headache   This is a new problem. The current episode started 1 to 4 weeks ago. The problem occurs intermittently. The problem has been waxing and waning. The pain is located in the bilateral region. The pain radiates to the left neck and right neck. The pain quality is similar to prior headaches. The quality of the pain is described as aching. The pain is moderate. Associated symptoms include dizziness and insomnia. Pertinent negatives include no abnormal behavior, blurred vision, eye redness, eye watering, scalp tenderness, seizures, sinus pressure, tinnitus, visual change or weight loss. Nothing aggravates the symptoms. She has tried nothing for the symptoms.     Review of  Systems   Constitutional: Positive for fatigue. Negative for weight gain and weight loss.   HENT: Negative for hoarse voice, sinus pressure/congestion and tinnitus.    Eyes: Negative for blurred vision and redness.   Respiratory: Negative for shortness of breath.    Cardiovascular: Negative for palpitations.   Endocrine: Negative for heat intolerance.   Neurological: Positive for dizziness and headaches. Negative for tremors and seizures.   Psychiatric/Behavioral: Positive for decreased concentration and depression. Negative for suicidal ideas. The patient is nervous/anxious and has insomnia.          Objective:      Physical Exam  Vitals reviewed.   Constitutional:       Appearance: She is well-developed.   HENT:      Head: Normocephalic and atraumatic.      Right Ear: There is impacted cerumen.      Left Ear: External ear normal.      Nose: Nose normal.      Mouth/Throat:      Pharynx: No oropharyngeal exudate.   Eyes:      General:         Right eye: No discharge.         Left eye: No discharge.      Conjunctiva/sclera: Conjunctivae normal.      Pupils: Pupils are equal, round, and reactive to light.   Neck:      Trachea: No tracheal deviation.   Cardiovascular:      Rate and Rhythm: Normal rate and regular rhythm.      Heart sounds: Normal heart sounds.   Pulmonary:      Effort: Pulmonary effort is normal.      Breath sounds: Normal breath sounds. No wheezing or rales.   Abdominal:      General: Bowel sounds are normal.      Palpations: Abdomen is soft. Abdomen is not rigid. There is no mass.      Tenderness: There is no abdominal tenderness. There is no guarding.   Musculoskeletal:      Cervical back: Normal range of motion and neck supple.   Lymphadenopathy:      Cervical: No cervical adenopathy.   Neurological:      General: No focal deficit present.      Mental Status: She is oriented to person, place, and time.      Cranial Nerves: No cranial nerve deficit.      Sensory: No sensory deficit.      Motor: No  atrophy.      Gait: Gait normal.      Deep Tendon Reflexes:      Reflex Scores:       Patellar reflexes are 2+ on the right side and 2+ on the left side.        Assessment:       Problem List Items Addressed This Visit        Psychiatric    Generalized anxiety disorder    Relevant Medications    mirtazapine (REMERON) 15 MG tablet    Moderate episode of recurrent major depressive disorder    Relevant Medications    DULoxetine (CYMBALTA) 30 MG capsule    mirtazapine (REMERON) 15 MG tablet       Endocrine    Hypothyroidism - Primary    Relevant Medications    levothyroxine (EUTHYROX) 100 MCG tablet    Other Relevant Orders    Comprehensive Metabolic Panel    TSH    CBC Auto Differential       GI    GERD (gastroesophageal reflux disease)    Relevant Medications    omeprazole (PRILOSEC) 40 MG capsule       Other    Insomnia    Relevant Medications    mirtazapine (REMERON) 15 MG tablet    nortriptyline (PAMELOR) 50 MG capsule      Other Visit Diagnoses     Dyslipidemia        Relevant Medications    atorvastatin (LIPITOR) 10 MG tablet    Other Relevant Orders    Comprehensive Metabolic Panel    Lipid Panel    Right ear impacted cerumen        Chronic nonintractable headache, unspecified headache type        Relevant Orders    Ambulatory referral/consult to Neurology          Plan:       Thad was seen today for thyroid problem, anxiety, depression, insomnia and headache.    Diagnoses and all orders for this visit:    Acquired hypothyroidism  -     levothyroxine (EUTHYROX) 100 MCG tablet; Take 1 tablet (100 mcg total) by mouth before breakfast.  -     Comprehensive Metabolic Panel; Future  -     TSH; Future  -     CBC Auto Differential; Future  Continue current dose    Moderate episode of recurrent major depressive disorder  -     DULoxetine (CYMBALTA) 30 MG capsule; Take 1 capsule (30 mg total) by mouth once daily.  -     mirtazapine (REMERON) 15 MG tablet; Take 1 tablet (15 mg total) by mouth every evening.  Advised  patient to not let medications run out  Meds refilled  Increased Remeron to help with insomnia    Generalized anxiety disorder  -     mirtazapine (REMERON) 15 MG tablet; Take 1 tablet (15 mg total) by mouth every evening.  As above    Insomnia, unspecified type  -     mirtazapine (REMERON) 15 MG tablet; Take 1 tablet (15 mg total) by mouth every evening.  -     nortriptyline (PAMELOR) 50 MG capsule; Take 1 capsule (50 mg total) by mouth every evening.  As above    Gastroesophageal reflux disease, unspecified whether esophagitis present  -     omeprazole (PRILOSEC) 40 MG capsule; Take 1 capsule (40 mg total) by mouth once daily.  Continue Prilosec    Dyslipidemia  -     atorvastatin (LIPITOR) 10 MG tablet; Take 1 tablet (10 mg total) by mouth once daily.  -     Comprehensive Metabolic Panel; Future  -     Lipid Panel; Future  Continue atorvastatin    Right ear impacted cerumen  -     Ear Cerumen Removal  Wax removed, see procedure note    Chronic nonintractable headache, unspecified headache type  -     Ambulatory referral/consult to Neurology; Future; Expected date: 04/04/2022  Neuro exam normal today  Will get neuro eval

## 2022-03-29 NOTE — PROCEDURES
Ear Cerumen Removal    Date/Time: 3/28/2022 3:40 PM  Performed by: Fadi Gresham Jr., MD  Authorized by: Fadi Gresham Jr., MD     Consent Done?:  Yes (Verbal)    Local anesthetic:  None  Location details:  Right ear  Procedure type: irrigation    Cerumen  Removal Results:  Cerumen completely removed  Patient tolerance:  Patient tolerated the procedure well with no immediate complications

## 2022-04-05 ENCOUNTER — TELEPHONE (OUTPATIENT)
Dept: ADMINISTRATIVE | Facility: HOSPITAL | Age: 82
End: 2022-04-05
Payer: MEDICARE

## 2022-09-12 ENCOUNTER — LAB VISIT (OUTPATIENT)
Dept: LAB | Facility: HOSPITAL | Age: 82
End: 2022-09-12
Attending: FAMILY MEDICINE
Payer: MEDICARE

## 2022-09-12 DIAGNOSIS — E03.9 ACQUIRED HYPOTHYROIDISM: ICD-10-CM

## 2022-09-12 DIAGNOSIS — E78.5 DYSLIPIDEMIA: ICD-10-CM

## 2022-09-12 LAB
ALBUMIN SERPL BCP-MCNC: 3.8 G/DL (ref 3.5–5.2)
ALP SERPL-CCNC: 66 U/L (ref 55–135)
ALT SERPL W/O P-5'-P-CCNC: 17 U/L (ref 10–44)
ANION GAP SERPL CALC-SCNC: 9 MMOL/L (ref 8–16)
AST SERPL-CCNC: 23 U/L (ref 10–40)
BASOPHILS # BLD AUTO: 0.02 K/UL (ref 0–0.2)
BASOPHILS NFR BLD: 0.3 % (ref 0–1.9)
BILIRUB SERPL-MCNC: 0.4 MG/DL (ref 0.1–1)
BUN SERPL-MCNC: 15 MG/DL (ref 8–23)
CALCIUM SERPL-MCNC: 9.8 MG/DL (ref 8.7–10.5)
CHLORIDE SERPL-SCNC: 106 MMOL/L (ref 95–110)
CHOLEST SERPL-MCNC: 156 MG/DL (ref 120–199)
CHOLEST/HDLC SERPL: 3.1 {RATIO} (ref 2–5)
CO2 SERPL-SCNC: 29 MMOL/L (ref 23–29)
CREAT SERPL-MCNC: 0.7 MG/DL (ref 0.5–1.4)
DIFFERENTIAL METHOD: ABNORMAL
EOSINOPHIL # BLD AUTO: 0.2 K/UL (ref 0–0.5)
EOSINOPHIL NFR BLD: 3.4 % (ref 0–8)
ERYTHROCYTE [DISTWIDTH] IN BLOOD BY AUTOMATED COUNT: 15.3 % (ref 11.5–14.5)
EST. GFR  (NO RACE VARIABLE): >60 ML/MIN/1.73 M^2
GLUCOSE SERPL-MCNC: 91 MG/DL (ref 70–110)
HCT VFR BLD AUTO: 40.4 % (ref 37–48.5)
HDLC SERPL-MCNC: 50 MG/DL (ref 40–75)
HDLC SERPL: 32.1 % (ref 20–50)
HGB BLD-MCNC: 13 G/DL (ref 12–16)
IMM GRANULOCYTES # BLD AUTO: 0.02 K/UL (ref 0–0.04)
IMM GRANULOCYTES NFR BLD AUTO: 0.3 % (ref 0–0.5)
LDLC SERPL CALC-MCNC: 79 MG/DL (ref 63–159)
LYMPHOCYTES # BLD AUTO: 2.2 K/UL (ref 1–4.8)
LYMPHOCYTES NFR BLD: 31.3 % (ref 18–48)
MCH RBC QN AUTO: 27.4 PG (ref 27–31)
MCHC RBC AUTO-ENTMCNC: 32.2 G/DL (ref 32–36)
MCV RBC AUTO: 85 FL (ref 82–98)
MONOCYTES # BLD AUTO: 0.5 K/UL (ref 0.3–1)
MONOCYTES NFR BLD: 7.7 % (ref 4–15)
NEUTROPHILS # BLD AUTO: 4 K/UL (ref 1.8–7.7)
NEUTROPHILS NFR BLD: 57 % (ref 38–73)
NONHDLC SERPL-MCNC: 106 MG/DL
NRBC BLD-RTO: 0 /100 WBC
PLATELET # BLD AUTO: 223 K/UL (ref 150–450)
PMV BLD AUTO: 12.1 FL (ref 9.2–12.9)
POTASSIUM SERPL-SCNC: 4.3 MMOL/L (ref 3.5–5.1)
PROT SERPL-MCNC: 7 G/DL (ref 6–8.4)
RBC # BLD AUTO: 4.74 M/UL (ref 4–5.4)
SODIUM SERPL-SCNC: 144 MMOL/L (ref 136–145)
TRIGL SERPL-MCNC: 135 MG/DL (ref 30–150)
TSH SERPL DL<=0.005 MIU/L-ACNC: 0.81 UIU/ML (ref 0.4–4)
WBC # BLD AUTO: 7.05 K/UL (ref 3.9–12.7)

## 2022-09-12 PROCEDURE — 84443 ASSAY THYROID STIM HORMONE: CPT | Performed by: FAMILY MEDICINE

## 2022-09-12 PROCEDURE — 85025 COMPLETE CBC W/AUTO DIFF WBC: CPT | Performed by: FAMILY MEDICINE

## 2022-09-12 PROCEDURE — 36415 COLL VENOUS BLD VENIPUNCTURE: CPT | Mod: PN | Performed by: FAMILY MEDICINE

## 2022-09-12 PROCEDURE — 80053 COMPREHEN METABOLIC PANEL: CPT | Performed by: FAMILY MEDICINE

## 2022-09-12 PROCEDURE — 80061 LIPID PANEL: CPT | Performed by: FAMILY MEDICINE

## 2022-09-21 ENCOUNTER — OFFICE VISIT (OUTPATIENT)
Dept: FAMILY MEDICINE | Facility: CLINIC | Age: 82
End: 2022-09-21
Payer: MEDICARE

## 2022-09-21 VITALS
HEART RATE: 95 BPM | OXYGEN SATURATION: 97 % | BODY MASS INDEX: 34.64 KG/M2 | TEMPERATURE: 98 F | SYSTOLIC BLOOD PRESSURE: 120 MMHG | WEIGHT: 188.25 LBS | HEIGHT: 62 IN | DIASTOLIC BLOOD PRESSURE: 64 MMHG

## 2022-09-21 DIAGNOSIS — F41.1 GENERALIZED ANXIETY DISORDER: ICD-10-CM

## 2022-09-21 DIAGNOSIS — Z23 NEEDS FLU SHOT: ICD-10-CM

## 2022-09-21 DIAGNOSIS — K21.9 GASTROESOPHAGEAL REFLUX DISEASE, UNSPECIFIED WHETHER ESOPHAGITIS PRESENT: ICD-10-CM

## 2022-09-21 DIAGNOSIS — M25.512 CHRONIC LEFT SHOULDER PAIN: ICD-10-CM

## 2022-09-21 DIAGNOSIS — L85.3 DRY SKIN: ICD-10-CM

## 2022-09-21 DIAGNOSIS — E03.9 ACQUIRED HYPOTHYROIDISM: ICD-10-CM

## 2022-09-21 DIAGNOSIS — G47.00 INSOMNIA, UNSPECIFIED TYPE: ICD-10-CM

## 2022-09-21 DIAGNOSIS — E78.5 DYSLIPIDEMIA: Primary | ICD-10-CM

## 2022-09-21 DIAGNOSIS — G89.29 CHRONIC LEFT SHOULDER PAIN: ICD-10-CM

## 2022-09-21 DIAGNOSIS — H53.8 BLURRED VISION: ICD-10-CM

## 2022-09-21 DIAGNOSIS — F33.1 MODERATE EPISODE OF RECURRENT MAJOR DEPRESSIVE DISORDER: ICD-10-CM

## 2022-09-21 PROCEDURE — 99214 PR OFFICE/OUTPT VISIT, EST, LEVL IV, 30-39 MIN: ICD-10-PCS | Mod: 25,S$GLB,, | Performed by: FAMILY MEDICINE

## 2022-09-21 PROCEDURE — 99499 UNLISTED E&M SERVICE: CPT | Mod: S$GLB,,, | Performed by: FAMILY MEDICINE

## 2022-09-21 PROCEDURE — 1160F RVW MEDS BY RX/DR IN RCRD: CPT | Mod: CPTII,S$GLB,, | Performed by: FAMILY MEDICINE

## 2022-09-21 PROCEDURE — 3288F FALL RISK ASSESSMENT DOCD: CPT | Mod: CPTII,S$GLB,, | Performed by: FAMILY MEDICINE

## 2022-09-21 PROCEDURE — 1160F PR REVIEW ALL MEDS BY PRESCRIBER/CLIN PHARMACIST DOCUMENTED: ICD-10-PCS | Mod: CPTII,S$GLB,, | Performed by: FAMILY MEDICINE

## 2022-09-21 PROCEDURE — 1101F PT FALLS ASSESS-DOCD LE1/YR: CPT | Mod: CPTII,S$GLB,, | Performed by: FAMILY MEDICINE

## 2022-09-21 PROCEDURE — 99499 RISK ADDL DX/OHS AUDIT: ICD-10-PCS | Mod: S$GLB,,, | Performed by: FAMILY MEDICINE

## 2022-09-21 PROCEDURE — 1101F PR PT FALLS ASSESS DOC 0-1 FALLS W/OUT INJ PAST YR: ICD-10-PCS | Mod: CPTII,S$GLB,, | Performed by: FAMILY MEDICINE

## 2022-09-21 PROCEDURE — 1159F PR MEDICATION LIST DOCUMENTED IN MEDICAL RECORD: ICD-10-PCS | Mod: CPTII,S$GLB,, | Performed by: FAMILY MEDICINE

## 2022-09-21 PROCEDURE — G0008 ADMIN INFLUENZA VIRUS VAC: HCPCS | Mod: S$GLB,,, | Performed by: FAMILY MEDICINE

## 2022-09-21 PROCEDURE — 3078F PR MOST RECENT DIASTOLIC BLOOD PRESSURE < 80 MM HG: ICD-10-PCS | Mod: CPTII,S$GLB,, | Performed by: FAMILY MEDICINE

## 2022-09-21 PROCEDURE — 1159F MED LIST DOCD IN RCRD: CPT | Mod: CPTII,S$GLB,, | Performed by: FAMILY MEDICINE

## 2022-09-21 PROCEDURE — 99999 PR PBB SHADOW E&M-EST. PATIENT-LVL V: ICD-10-PCS | Mod: PBBFAC,,, | Performed by: FAMILY MEDICINE

## 2022-09-21 PROCEDURE — 3074F SYST BP LT 130 MM HG: CPT | Mod: CPTII,S$GLB,, | Performed by: FAMILY MEDICINE

## 2022-09-21 PROCEDURE — 90694 VACC AIIV4 NO PRSRV 0.5ML IM: CPT | Mod: S$GLB,,, | Performed by: FAMILY MEDICINE

## 2022-09-21 PROCEDURE — 1126F PR PAIN SEVERITY QUANTIFIED, NO PAIN PRESENT: ICD-10-PCS | Mod: CPTII,S$GLB,, | Performed by: FAMILY MEDICINE

## 2022-09-21 PROCEDURE — 90694 FLU VACCINE - QUADRIVALENT - ADJUVANTED: ICD-10-PCS | Mod: S$GLB,,, | Performed by: FAMILY MEDICINE

## 2022-09-21 PROCEDURE — 3074F PR MOST RECENT SYSTOLIC BLOOD PRESSURE < 130 MM HG: ICD-10-PCS | Mod: CPTII,S$GLB,, | Performed by: FAMILY MEDICINE

## 2022-09-21 PROCEDURE — 99999 PR PBB SHADOW E&M-EST. PATIENT-LVL V: CPT | Mod: PBBFAC,,, | Performed by: FAMILY MEDICINE

## 2022-09-21 PROCEDURE — 1126F AMNT PAIN NOTED NONE PRSNT: CPT | Mod: CPTII,S$GLB,, | Performed by: FAMILY MEDICINE

## 2022-09-21 PROCEDURE — G0008 FLU VACCINE - QUADRIVALENT - ADJUVANTED: ICD-10-PCS | Mod: S$GLB,,, | Performed by: FAMILY MEDICINE

## 2022-09-21 PROCEDURE — 3288F PR FALLS RISK ASSESSMENT DOCUMENTED: ICD-10-PCS | Mod: CPTII,S$GLB,, | Performed by: FAMILY MEDICINE

## 2022-09-21 PROCEDURE — 3078F DIAST BP <80 MM HG: CPT | Mod: CPTII,S$GLB,, | Performed by: FAMILY MEDICINE

## 2022-09-21 PROCEDURE — 99214 OFFICE O/P EST MOD 30 MIN: CPT | Mod: 25,S$GLB,, | Performed by: FAMILY MEDICINE

## 2022-09-21 RX ORDER — ATORVASTATIN CALCIUM 10 MG/1
10 TABLET, FILM COATED ORAL DAILY
Qty: 90 TABLET | Refills: 3 | Status: SHIPPED | OUTPATIENT
Start: 2022-09-21 | End: 2023-10-17

## 2022-09-21 RX ORDER — LEVOTHYROXINE SODIUM 100 UG/1
100 TABLET ORAL
Qty: 90 TABLET | Refills: 3 | Status: SHIPPED | OUTPATIENT
Start: 2022-09-21 | End: 2023-10-23 | Stop reason: SDUPTHER

## 2022-09-21 RX ORDER — DICLOFENAC SODIUM 10 MG/G
GEL TOPICAL
Qty: 100 G | Refills: 11 | Status: SHIPPED | OUTPATIENT
Start: 2022-09-21

## 2022-09-21 RX ORDER — MIRTAZAPINE 30 MG/1
30 TABLET, FILM COATED ORAL NIGHTLY
Qty: 90 TABLET | Refills: 2 | Status: SHIPPED | OUTPATIENT
Start: 2022-09-21 | End: 2023-07-10 | Stop reason: SDUPTHER

## 2022-09-21 RX ORDER — NORTRIPTYLINE HYDROCHLORIDE 50 MG/1
50 CAPSULE ORAL NIGHTLY
Qty: 90 CAPSULE | Refills: 3 | Status: SHIPPED | OUTPATIENT
Start: 2022-09-21 | End: 2023-10-23 | Stop reason: SDUPTHER

## 2022-09-21 RX ORDER — OMEPRAZOLE 20 MG/1
20 CAPSULE, DELAYED RELEASE ORAL
Qty: 180 CAPSULE | Refills: 3 | Status: SHIPPED | OUTPATIENT
Start: 2022-09-21 | End: 2023-10-23

## 2022-09-21 RX ORDER — DULOXETIN HYDROCHLORIDE 30 MG/1
30 CAPSULE, DELAYED RELEASE ORAL DAILY
Qty: 90 CAPSULE | Refills: 3 | Status: SHIPPED | OUTPATIENT
Start: 2022-09-21 | End: 2023-03-21

## 2022-09-21 RX ORDER — AMMONIUM LACTATE 12 G/100G
LOTION TOPICAL
Qty: 396 G | Refills: 11 | Status: SHIPPED | OUTPATIENT
Start: 2022-09-21

## 2022-09-21 NOTE — PATIENT INSTRUCTIONS
Llalissa al [phone] para programar jordan courtney para el [facility]    Luh al [phone]( [facility]) para programar diaz courtney de patricio conductual / patricio mental (terapista)

## 2022-09-26 NOTE — PROGRESS NOTES
Subjective:       Patient ID: Thad Maki is a 82 y.o. female.    Chief Complaint: Chronic Conditions    HPI  82 year old female comes in for follow up on chronic conditions. She is taking her thyroid medication as prescribed.   She reports continued GERD symptoms with eating. She does have several dietary indiscretions. She states she often takes an extra dose of omeprazole for her symptoms and this helps. She denies any weight loss, or blood in stools. She does report constipation and having to push hard to stool She admits to poor water intake.  She also reports continued concerns with insomnia despite using sleep medications. In addition she continues to have depression and symptoms of anxiety on most days.   Patient also reports greater than a year of left shoulder pain. States that recently getting worse. Reports no swelling or redness. No injury reported. No numbness/tingling in fingers.     Review of Systems   Constitutional:  Positive for fatigue.   HENT:  Negative for sinus pressure/congestion and tinnitus.    Eyes:  Positive for visual disturbance (blurry vision). Negative for redness.   Respiratory:  Negative for shortness of breath and wheezing.    Cardiovascular:  Negative for chest pain and palpitations.   Gastrointestinal:  Positive for abdominal pain, constipation and reflux. Negative for blood in stool and diarrhea.   Endocrine: Negative for heat intolerance.   Genitourinary:  Negative for dysuria, frequency and hematuria.   Neurological:  Positive for dizziness and headaches. Negative for tremors and seizures.   Psychiatric/Behavioral:  Positive for decreased concentration. Negative for suicidal ideas. The patient is nervous/anxious.        Objective:      Physical Exam  Vitals reviewed.   Constitutional:       General: She is not in acute distress.     Appearance: She is well-developed. She is obese.   HENT:      Head: Normocephalic and atraumatic.      Right Ear: There is impacted  cerumen.      Left Ear: External ear normal.      Nose: Nose normal.      Mouth/Throat:      Pharynx: No oropharyngeal exudate.   Eyes:      General:         Right eye: No discharge.         Left eye: No discharge.      Conjunctiva/sclera: Conjunctivae normal.      Pupils: Pupils are equal, round, and reactive to light.   Neck:      Trachea: No tracheal deviation.   Cardiovascular:      Rate and Rhythm: Normal rate and regular rhythm.      Heart sounds: Normal heart sounds.   Pulmonary:      Effort: Pulmonary effort is normal.      Breath sounds: Normal breath sounds. No wheezing or rales.   Abdominal:      General: Bowel sounds are normal.      Palpations: Abdomen is soft. Abdomen is not rigid. There is no mass.      Tenderness: There is no abdominal tenderness. There is no guarding.   Musculoskeletal:      Cervical back: Normal range of motion and neck supple.   Lymphadenopathy:      Cervical: No cervical adenopathy.   Neurological:      General: No focal deficit present.      Mental Status: She is alert and oriented to person, place, and time.      Cranial Nerves: No cranial nerve deficit.      Sensory: No sensory deficit.      Motor: No atrophy.      Gait: Gait normal.      Deep Tendon Reflexes:      Reflex Scores:       Patellar reflexes are 2+ on the right side and 2+ on the left side.        Lab Visit on 09/12/2022   Component Date Value Ref Range Status    Sodium 09/12/2022 144  136 - 145 mmol/L Final    Potassium 09/12/2022 4.3  3.5 - 5.1 mmol/L Final    Chloride 09/12/2022 106  95 - 110 mmol/L Final    CO2 09/12/2022 29  23 - 29 mmol/L Final    Glucose 09/12/2022 91  70 - 110 mg/dL Final    BUN 09/12/2022 15  8 - 23 mg/dL Final    Creatinine 09/12/2022 0.7  0.5 - 1.4 mg/dL Final    Calcium 09/12/2022 9.8  8.7 - 10.5 mg/dL Final    Total Protein 09/12/2022 7.0  6.0 - 8.4 g/dL Final    Albumin 09/12/2022 3.8  3.5 - 5.2 g/dL Final    Total Bilirubin 09/12/2022 0.4  0.1 - 1.0 mg/dL Final    Comment: For  infants and newborns, interpretation of results should be based  on gestational age, weight and in agreement with clinical  observations.    Premature Infant recommended reference ranges:  Up to 24 hours.............<8.0 mg/dL  Up to 48 hours............<12.0 mg/dL  3-5 days..................<15.0 mg/dL  6-29 days.................<15.0 mg/dL      Alkaline Phosphatase 09/12/2022 66  55 - 135 U/L Final    AST 09/12/2022 23  10 - 40 U/L Final    ALT 09/12/2022 17  10 - 44 U/L Final    Anion Gap 09/12/2022 9  8 - 16 mmol/L Final    eGFR 09/12/2022 >60  >60 mL/min/1.73 m^2 Final    TSH 09/12/2022 0.809  0.400 - 4.000 uIU/mL Final    WBC 09/12/2022 7.05  3.90 - 12.70 K/uL Final    RBC 09/12/2022 4.74  4.00 - 5.40 M/uL Final    Hemoglobin 09/12/2022 13.0  12.0 - 16.0 g/dL Final    Hematocrit 09/12/2022 40.4  37.0 - 48.5 % Final    MCV 09/12/2022 85  82 - 98 fL Final    MCH 09/12/2022 27.4  27.0 - 31.0 pg Final    MCHC 09/12/2022 32.2  32.0 - 36.0 g/dL Final    RDW 09/12/2022 15.3 (H)  11.5 - 14.5 % Final    Platelets 09/12/2022 223  150 - 450 K/uL Final    MPV 09/12/2022 12.1  9.2 - 12.9 fL Final    Immature Granulocytes 09/12/2022 0.3  0.0 - 0.5 % Final    Gran # (ANC) 09/12/2022 4.0  1.8 - 7.7 K/uL Final    Immature Grans (Abs) 09/12/2022 0.02  0.00 - 0.04 K/uL Final    Comment: Mild elevation in immature granulocytes is non specific and   can be seen in a variety of conditions including stress response,   acute inflammation, trauma and pregnancy. Correlation with other   laboratory and clinical findings is essential.      Lymph # 09/12/2022 2.2  1.0 - 4.8 K/uL Final    Mono # 09/12/2022 0.5  0.3 - 1.0 K/uL Final    Eos # 09/12/2022 0.2  0.0 - 0.5 K/uL Final    Baso # 09/12/2022 0.02  0.00 - 0.20 K/uL Final    nRBC 09/12/2022 0  0 /100 WBC Final    Gran % 09/12/2022 57.0  38.0 - 73.0 % Final    Lymph % 09/12/2022 31.3  18.0 - 48.0 % Final    Mono % 09/12/2022 7.7  4.0 - 15.0 % Final    Eosinophil % 09/12/2022 3.4  0.0 -  8.0 % Final    Basophil % 09/12/2022 0.3  0.0 - 1.9 % Final    Differential Method 09/12/2022 Automated   Final    Cholesterol 09/12/2022 156  120 - 199 mg/dL Final    Comment: The National Cholesterol Education Program (NCEP) has set the  following guidelines (reference ranges) for Cholesterol:  Optimal.....................<200 mg/dL  Borderline High.............200-239 mg/dL  High........................> or = 240 mg/dL      Triglycerides 09/12/2022 135  30 - 150 mg/dL Final    Comment: The National Cholesterol Education Program (NCEP) has set the  following guidelines (reference values) for triglycerides:  Normal......................<150 mg/dL  Borderline High.............150-199 mg/dL  High........................200-499 mg/dL      HDL 09/12/2022 50  40 - 75 mg/dL Final    Comment: The National Cholesterol Education Program (NCEP) has set the  following guidelines (reference values) for HDL Cholesterol:  Low...............<40 mg/dL  Optimal...........>60 mg/dL      LDL Cholesterol 09/12/2022 79.0  63.0 - 159.0 mg/dL Final    Comment: The National Cholesterol Education Program (NCEP) has set the  following guidelines (reference values) for LDL Cholesterol:  Optimal.......................<130 mg/dL  Borderline High...............130-159 mg/dL  High..........................160-189 mg/dL  Very High.....................>190 mg/dL      HDL/Cholesterol Ratio 09/12/2022 32.1  20.0 - 50.0 % Final    Total Cholesterol/HDL Ratio 09/12/2022 3.1  2.0 - 5.0 Final    Non-HDL Cholesterol 09/12/2022 106  mg/dL Final    Comment: Risk category and Non-HDL cholesterol goals:  Coronary heart disease (CHD)or equivalent (10-year risk of CHD >20%):  Non-HDL cholesterol goal     <130 mg/dL  Two or more CHD risk factors and 10-year risk of CHD <= 20%:  Non-HDL cholesterol goal     <160 mg/dL  0 to 1 CHD risk factor:  Non-HDL cholesterol goal     <190 mg/dL         Assessment:       Problem List Items Addressed This Visit           Psychiatric    Generalized anxiety disorder    Relevant Medications    mirtazapine (REMERON) 30 MG tablet    Moderate episode of recurrent major depressive disorder    Relevant Medications    DULoxetine (CYMBALTA) 30 MG capsule    mirtazapine (REMERON) 30 MG tablet    Other Relevant Orders    Ambulatory referral/consult to Social Work       Endocrine    Hypothyroidism    Relevant Medications    levothyroxine (EUTHYROX) 100 MCG tablet       GI    GERD (gastroesophageal reflux disease)    Relevant Medications    omeprazole (PRILOSEC) 20 MG capsule    Other Relevant Orders    Ambulatory referral/consult to Gastroenterology       Other    Insomnia    Relevant Medications    mirtazapine (REMERON) 30 MG tablet    nortriptyline (PAMELOR) 50 MG capsule     Other Visit Diagnoses       Dyslipidemia    -  Primary    Relevant Medications    atorvastatin (LIPITOR) 10 MG tablet    Blurred vision        Relevant Orders    Ambulatory referral/consult to Optometry    Dry skin        Relevant Medications    ammonium lactate (LAC-HYDRIN) 12 % lotion    Chronic left shoulder pain        Relevant Medications    diclofenac sodium (VOLTAREN) 1 % Gel    Needs flu shot        Relevant Orders    Influenza (FLUAD) - Quadrivalent (Adjuvanted) *Preferred* (65+) (PF) (Completed)              Plan:       Thad was seen today for chronic conditions.    Diagnoses and all orders for this visit:    Dyslipidemia  -     atorvastatin (LIPITOR) 10 MG tablet; Take 1 tablet (10 mg total) by mouth once daily.  Continue atorvastatin    Blurred vision  -     Ambulatory referral/consult to Optometry; Future  Referral for eye exam placed    Acquired hypothyroidism  -     levothyroxine (EUTHYROX) 100 MCG tablet; Take 1 tablet (100 mcg total) by mouth before breakfast.  Continue current dose of levothyroxine    Moderate episode of recurrent major depressive disorder/Generalized anxiety disorder  -     DULoxetine (CYMBALTA) 30 MG capsule; Take 1 capsule (30 mg  total) by mouth once daily.  -     mirtazapine (REMERON) 30 MG tablet; Take 1 tablet (30 mg total) by mouth every evening.  -     Ambulatory referral/consult to Social Work; Future  Declines psychiatry eval but does agree to see a therapist  Will increase Remeron to 30 mg and will reevaluate in 4-6 weeks    Dry skin  -     ammonium lactate (LAC-HYDRIN) 12 % lotion; Apply topically to itchy areas after bathing once daily  Continue Lac Hydrin    Chronic left shoulder pain  -     diclofenac sodium (VOLTAREN) 1 % Gel; Apply 2 grams to left shoulder QID-PRN for pain  Courser of Voltaren gel  If not improved will get ortho eval    Needs flu shot  -     Influenza (FLUAD) - Quadrivalent (Adjuvanted) *Preferred* (65+) (PF)    Insomnia, unspecified type  -     mirtazapine (REMERON) 30 MG tablet; Take 1 tablet (30 mg total) by mouth every evening.  -     nortriptyline (PAMELOR) 50 MG capsule; Take 1 capsule (50 mg total) by mouth every evening.    Gastroesophageal reflux disease, unspecified whether esophagitis present  -     omeprazole (PRILOSEC) 20 MG capsule; Take 1 capsule (20 mg total) by mouth 2 (two) times daily before meals.  -     Ambulatory referral/consult to Gastroenterology; Future  Change omeprazole to BID  Will get GI eval

## 2023-02-22 ENCOUNTER — TELEPHONE (OUTPATIENT)
Dept: FAMILY MEDICINE | Facility: CLINIC | Age: 83
End: 2023-02-22
Payer: MEDICARE

## 2023-02-22 NOTE — TELEPHONE ENCOUNTER
----- Message from Lindsey Hurd sent at 2/22/2023 10:10 AM CST -----  Regarding: Appointment  Pt has been experiencing headaches since Friday. It hasn't cleared even after they take over-the-counter medicine. Pt wants to come in today instead of in May.      Ms. Oneil @ [phone]

## 2023-02-22 NOTE — TELEPHONE ENCOUNTER
Called patient back [place] , [place] [phone] - to notify patient that there were no same-day appointments available and she would have to refer to urgent care to get treatment today. Patient declined and stated she will wait until availability opens up. Provided scheduling department and reiterated that she should refer to urgent care given her symptoms. She declined again.

## 2023-03-21 DIAGNOSIS — F33.1 MODERATE EPISODE OF RECURRENT MAJOR DEPRESSIVE DISORDER: ICD-10-CM

## 2023-03-21 RX ORDER — DULOXETIN HYDROCHLORIDE 30 MG/1
CAPSULE, DELAYED RELEASE ORAL
Qty: 90 CAPSULE | Refills: 1 | Status: SHIPPED | OUTPATIENT
Start: 2023-03-21 | End: 2023-03-24 | Stop reason: SDUPTHER

## 2023-03-21 NOTE — TELEPHONE ENCOUNTER
No new care gaps identified.  [facility] Embedded Care Gaps. Reference number: [id number]. 3/21/2023   11:59:02 AM ANGELAT

## 2023-03-21 NOTE — TELEPHONE ENCOUNTER
Refill Decision Note   Thad Maki  is requesting a refill authorization.  Brief Assessment and Rationale for Refill:  Approve     Medication Therapy Plan:       Medication Reconciliation Completed: No   Comments:     No Care Gaps recommended.     Note composed:1:14 PM 03/21/2023

## 2023-03-22 NOTE — TELEPHONE ENCOUNTER
"Patient was a no show for today's appointment so I called to get her RS with a NP so that she could continue receiving medication refills. Patient hadn't seen her PCP in almost a year. Patient declined appointment with NP. She stated that she wanted to see her doctor. I let her know that his next available is in July. She stated, "If I have to wait until July to see my doctor I will go and look for another doctor." I explained to patient that we can schedule her for the July appt and place her on the waitlist that way if a sooner appt became available she would be contacted. Patient refused that as well. I told patient that I will let provider know. She said, "Yeah, yeah ok."      #[id number] was utilized for this call     "

## 2023-03-24 RX ORDER — DULOXETIN HYDROCHLORIDE 30 MG/1
30 CAPSULE, DELAYED RELEASE ORAL DAILY
Qty: 30 CAPSULE | Refills: 0 | Status: SHIPPED | OUTPATIENT
Start: 2023-03-24 | End: 2024-01-22

## 2023-06-29 DIAGNOSIS — G47.00 INSOMNIA, UNSPECIFIED TYPE: ICD-10-CM

## 2023-06-29 DIAGNOSIS — F33.1 MODERATE EPISODE OF RECURRENT MAJOR DEPRESSIVE DISORDER: ICD-10-CM

## 2023-06-29 DIAGNOSIS — F41.1 GENERALIZED ANXIETY DISORDER: ICD-10-CM

## 2023-06-29 RX ORDER — MIRTAZAPINE 30 MG/1
TABLET, FILM COATED ORAL
Qty: 90 TABLET | Refills: 0 | OUTPATIENT
Start: 2023-06-29

## 2023-06-30 NOTE — TELEPHONE ENCOUNTER
Refill Routing Note   Medication(s) are not appropriate for processing by [facility] for the following reason(s):      Responsible provider unclear    [facility] action(s):  Defer None identified            Appointments  past 12m or future 3m with PCP    Date Provider   Last Visit   9/21/2022 Fadi Gresham Jr., MD   Next Visit   11/15/2023 Fadi Gresham Jr., MD   ED visits in past 90 days: 0        Note composed:10:54 PM 06/29/2023

## 2023-07-10 ENCOUNTER — OFFICE VISIT (OUTPATIENT)
Dept: FAMILY MEDICINE | Facility: CLINIC | Age: 83
End: 2023-07-10
Payer: MEDICARE

## 2023-07-10 VITALS
DIASTOLIC BLOOD PRESSURE: 66 MMHG | WEIGHT: 188.69 LBS | BODY MASS INDEX: 34.72 KG/M2 | SYSTOLIC BLOOD PRESSURE: 98 MMHG | OXYGEN SATURATION: 98 % | HEIGHT: 62 IN | TEMPERATURE: 98 F | HEART RATE: 91 BPM

## 2023-07-10 DIAGNOSIS — E66.09 CLASS 1 OBESITY DUE TO EXCESS CALORIES WITH BODY MASS INDEX (BMI) OF 34.0 TO 34.9 IN ADULT, UNSPECIFIED WHETHER SERIOUS COMORBIDITY PRESENT: ICD-10-CM

## 2023-07-10 DIAGNOSIS — N95.1 HOT FLASHES DUE TO MENOPAUSE: Primary | ICD-10-CM

## 2023-07-10 DIAGNOSIS — Z86.39 H/O: OBESITY: ICD-10-CM

## 2023-07-10 DIAGNOSIS — F41.1 GENERALIZED ANXIETY DISORDER: ICD-10-CM

## 2023-07-10 DIAGNOSIS — M54.9 BACK PAIN, UNSPECIFIED BACK LOCATION, UNSPECIFIED BACK PAIN LATERALITY, UNSPECIFIED CHRONICITY: ICD-10-CM

## 2023-07-10 DIAGNOSIS — F33.1 MODERATE EPISODE OF RECURRENT MAJOR DEPRESSIVE DISORDER: ICD-10-CM

## 2023-07-10 DIAGNOSIS — M25.559 HIP PAIN, UNSPECIFIED LATERALITY: ICD-10-CM

## 2023-07-10 DIAGNOSIS — G47.00 INSOMNIA, UNSPECIFIED TYPE: ICD-10-CM

## 2023-07-10 DIAGNOSIS — N39.41 URGE INCONTINENCE: ICD-10-CM

## 2023-07-10 DIAGNOSIS — Z00.00 HEALTHCARE MAINTENANCE: ICD-10-CM

## 2023-07-10 PROCEDURE — 99214 PR OFFICE/OUTPT VISIT, EST, LEVL IV, 30-39 MIN: ICD-10-PCS | Mod: HCNC,S$GLB,, | Performed by: INTERNAL MEDICINE

## 2023-07-10 PROCEDURE — 3078F PR MOST RECENT DIASTOLIC BLOOD PRESSURE < 80 MM HG: ICD-10-PCS | Mod: HCNC,CPTII,S$GLB, | Performed by: INTERNAL MEDICINE

## 2023-07-10 PROCEDURE — 99999 PR PBB SHADOW E&M-EST. PATIENT-LVL V: ICD-10-PCS | Mod: PBBFAC,HCNC,, | Performed by: INTERNAL MEDICINE

## 2023-07-10 PROCEDURE — 1126F PR PAIN SEVERITY QUANTIFIED, NO PAIN PRESENT: ICD-10-PCS | Mod: HCNC,CPTII,S$GLB, | Performed by: INTERNAL MEDICINE

## 2023-07-10 PROCEDURE — 3078F DIAST BP <80 MM HG: CPT | Mod: HCNC,CPTII,S$GLB, | Performed by: INTERNAL MEDICINE

## 2023-07-10 PROCEDURE — 1101F PT FALLS ASSESS-DOCD LE1/YR: CPT | Mod: HCNC,CPTII,S$GLB, | Performed by: INTERNAL MEDICINE

## 2023-07-10 PROCEDURE — 99214 OFFICE O/P EST MOD 30 MIN: CPT | Mod: HCNC,S$GLB,, | Performed by: INTERNAL MEDICINE

## 2023-07-10 PROCEDURE — 3288F PR FALLS RISK ASSESSMENT DOCUMENTED: ICD-10-PCS | Mod: HCNC,CPTII,S$GLB, | Performed by: INTERNAL MEDICINE

## 2023-07-10 PROCEDURE — 1160F PR REVIEW ALL MEDS BY PRESCRIBER/CLIN PHARMACIST DOCUMENTED: ICD-10-PCS | Mod: HCNC,CPTII,S$GLB, | Performed by: INTERNAL MEDICINE

## 2023-07-10 PROCEDURE — 1159F PR MEDICATION LIST DOCUMENTED IN MEDICAL RECORD: ICD-10-PCS | Mod: HCNC,CPTII,S$GLB, | Performed by: INTERNAL MEDICINE

## 2023-07-10 PROCEDURE — 3074F PR MOST RECENT SYSTOLIC BLOOD PRESSURE < 130 MM HG: ICD-10-PCS | Mod: HCNC,CPTII,S$GLB, | Performed by: INTERNAL MEDICINE

## 2023-07-10 PROCEDURE — 1101F PR PT FALLS ASSESS DOC 0-1 FALLS W/OUT INJ PAST YR: ICD-10-PCS | Mod: HCNC,CPTII,S$GLB, | Performed by: INTERNAL MEDICINE

## 2023-07-10 PROCEDURE — 1159F MED LIST DOCD IN RCRD: CPT | Mod: HCNC,CPTII,S$GLB, | Performed by: INTERNAL MEDICINE

## 2023-07-10 PROCEDURE — 3288F FALL RISK ASSESSMENT DOCD: CPT | Mod: HCNC,CPTII,S$GLB, | Performed by: INTERNAL MEDICINE

## 2023-07-10 PROCEDURE — 1160F RVW MEDS BY RX/DR IN RCRD: CPT | Mod: HCNC,CPTII,S$GLB, | Performed by: INTERNAL MEDICINE

## 2023-07-10 PROCEDURE — 99999 PR PBB SHADOW E&M-EST. PATIENT-LVL V: CPT | Mod: PBBFAC,HCNC,, | Performed by: INTERNAL MEDICINE

## 2023-07-10 PROCEDURE — 1126F AMNT PAIN NOTED NONE PRSNT: CPT | Mod: HCNC,CPTII,S$GLB, | Performed by: INTERNAL MEDICINE

## 2023-07-10 PROCEDURE — 3074F SYST BP LT 130 MM HG: CPT | Mod: HCNC,CPTII,S$GLB, | Performed by: INTERNAL MEDICINE

## 2023-07-10 RX ORDER — TIZANIDINE 2 MG/1
2 TABLET ORAL EVERY 8 HOURS PRN
Qty: 30 TABLET | Refills: 0 | Status: SHIPPED | OUTPATIENT
Start: 2023-07-10 | End: 2023-07-20

## 2023-07-10 RX ORDER — MIRTAZAPINE 30 MG/1
30 TABLET, FILM COATED ORAL NIGHTLY
Qty: 90 TABLET | Refills: 2 | Status: SHIPPED | OUTPATIENT
Start: 2023-07-10 | End: 2024-04-05

## 2023-07-10 NOTE — PROGRESS NOTES
HISTORY OF PRESENT ILLNESS:  Thad Maki is a 82 y.o. female who presents to the clinic today for Female  Problem (X5-6nweeks) and Pain (Rt side undes)    My first encounter with patient.  Patient is accompanied by son today.   service was used to conduct visit today.    Right hip/buttock pain  Taking ibuprofen 2 pills up to 2-3 times per day on days when she has pain.  Helps moderately.  Takes nortriptyline nightly.  Taking Cymbalta also.  Increased for several months but is not occurring daily.  Notes occasional numbness to the front of the leg that is self limited.  Notes she has had injection for this in the past.    Hot flashes - Ongoing for several years.  Urgency of urination that is increased at night time    PAST MEDICAL HISTORY:  Past Medical History:   Diagnosis Date    Anxiety     Depression     Gastritis     GERD (gastroesophageal reflux disease)     Hyperlipidemia     Hypothyroidism     Insomnia        PAST SURGICAL HISTORY:  Past Surgical History:   Procedure Laterality Date    CHOLECYSTECTOMY  2015/2016    KNEE ARTHROPLASTY      CAN'T REMEMBER WHAT SIDE OR WHEN       SOCIAL HISTORY:  Social History     Socioeconomic History    Marital status:    Tobacco Use    Smoking status: Never    Smokeless tobacco: Never   Substance and Sexual Activity    Alcohol use: Never    Drug use: Never    Sexual activity: Not Currently     Partners: Male       FAMILY HISTORY:  No family history on file.    ALLERGIES AND MEDICATIONS: updated and reviewed.  Review of patient's allergies indicates:  No Known Allergies  Medication List with Changes/Refills   Current Medications    AMMONIUM LACTATE (LAC-HYDRIN) 12 % LOTION    Apply topically to itchy areas after bathing once daily    ATORVASTATIN (LIPITOR) 10 MG TABLET    Take 1 tablet (10 mg total) by mouth once daily.    CALCIUM CARBONATE-VITAMIN D3 600 MG-20 MCG (800 UNIT) TAB    Take 1 tablet by mouth once daily.    DICLOFENAC SODIUM (VOLTAREN) 1  % GEL    Apply 2 grams to left shoulder QID-PRN for pain    DULOXETINE (CYMBALTA) 30 MG CAPSULE    Take 1 capsule (30 mg total) by mouth once daily.    LEVOTHYROXINE (EUTHYROX) 100 MCG TABLET    Take 1 tablet (100 mcg total) by mouth before breakfast.    MIRTAZAPINE (REMERON) 30 MG TABLET    Take 1 tablet (30 mg total) by mouth every evening.    NORTRIPTYLINE (PAMELOR) 50 MG CAPSULE    Take 1 capsule (50 mg total) by mouth every evening.    OMEPRAZOLE (PRILOSEC) 20 MG CAPSULE    Take 1 capsule (20 mg total) by mouth 2 (two) times daily before meals.          CARE TEAM:  Patient Care Team:  Fadi Grseham Jr., MD as PCP - General (Family Medicine)         REVIEW OF SYSTEMS:  Review of Systems   Constitutional:  Negative for chills and fever.   HENT:  Negative for congestion and postnasal drip.    Eyes:  Negative for photophobia and visual disturbance.   Respiratory:  Negative for cough and shortness of breath.    Cardiovascular:  Negative for chest pain and palpitations.   Gastrointestinal:  Negative for nausea and vomiting.   Endocrine: Positive for heat intolerance. Negative for polydipsia.   Genitourinary:  Negative for dysuria and frequency.   Musculoskeletal:  Positive for arthralgias and back pain. Negative for gait problem.   Neurological:  Negative for light-headedness and headaches.   Psychiatric/Behavioral:  Negative for dysphoric mood. The patient is not hyperactive.        PHYSICAL EXAM:   Vitals:    07/10/23 0934   BP: 98/66   Pulse: 91   Temp: 98.3 °F (36.8 °C)             Body mass index is 34.52 kg/m².     General appearance - alert, well appearing, and in no distress, oriented to person, place, and time, and obese  Mental status - normal mood, behavior, speech, dress, motor activity, and thought processes  Eyes - sclera anicteric, left eye normal, right eye normal  Chest - no tachypnea, retractions or cyanosis  Back exam - full range of motion, no tenderness, palpable spasm or pain on motion,  sacroiliac joints and sciatic notches nontender, normal reflexes and strength bilateral lower extremities  Neurological - alert, oriented, normal speech, no focal findings or movement disorder noted  Musculoskeletal - no joint tenderness, deformity or swelling  Extremities - peripheral pulses normal, no pedal edema, no clubbing or cyanosis      ASSESSMENT AND PLAN:  Hot flashes due to menopause  -     Ambulatory referral/consult to Obstetrics / Gynecology; Future; Expected date: 07/17/2023    Urge incontinence  -     Ambulatory referral/consult to Obstetrics / Gynecology; Future; Expected date: 07/17/2023  - Advised for bladder retraining.    Moderate episode of recurrent major depressive disorder  -     mirtazapine (REMERON) 30 MG tablet; Take 1 tablet (30 mg total) by mouth every evening.  Dispense: 90 tablet; Refill: 2    Generalized anxiety disorder  -     mirtazapine (REMERON) 30 MG tablet; Take 1 tablet (30 mg total) by mouth every evening.  Dispense: 90 tablet; Refill: 2    Insomnia, unspecified type  -     mirtazapine (REMERON) 30 MG tablet; Take 1 tablet (30 mg total) by mouth every evening.  Dispense: 90 tablet; Refill: 2    Hip pain, unspecified laterality  Back pain, unspecified back location, unspecified back pain laterality, unspecified chronicity  -     X-Ray Lumbar Spine AP And Lateral; Future; Expected date: 07/10/2023  -     Ambulatory referral/consult to Physical/Occupational Therapy; Future; Expected date: 07/17/2023  -     tiZANidine (ZANAFLEX) 2 MG tablet; Take 1 tablet (2 mg total) by mouth every 8 (eight) hours as needed (muscle pain).  Dispense: 30 tablet; Refill: 0  - Continue prn NSAID conservatively.  Refer to pain management if symptoms not controlled after 2 weeks.    Healthcare maintenance  -     Hemoglobin A1C; Future; Expected date: 07/10/2023  -     Comprehensive Metabolic Panel; Future; Expected date: 07/10/2023  -     Lipid Panel; Future; Expected date: 07/10/2023  -     CBC Auto  Differential; Future; Expected date: 07/10/2023  -     TSH; Future; Expected date: 07/10/2023  -     Vitamin D; Future; Expected date: 07/10/2023    Class 1 obesity due to excess calories with body mass index (BMI) of 34.0 to 34.9 in adult, unspecified whether serious comorbidity present  -     Hemoglobin A1C; Future; Expected date: 07/10/2023  -     Comprehensive Metabolic Panel; Future; Expected date: 07/10/2023  -     Lipid Panel; Future; Expected date: 07/10/2023  -     CBC Auto Differential; Future; Expected date: 07/10/2023  -     TSH; Future; Expected date: 07/10/2023  -     Vitamin D; Future; Expected date: 07/10/2023    H/O: obesity  -     Hemoglobin A1C; Future; Expected date: 07/10/2023  -     TSH; Future; Expected date: 07/10/2023              Follow up 3 months or sooner as needed.

## 2023-08-10 ENCOUNTER — PES CALL (OUTPATIENT)
Dept: ADMINISTRATIVE | Facility: CLINIC | Age: 83
End: 2023-08-10
Payer: MEDICARE

## 2023-09-07 ENCOUNTER — CLINICAL SUPPORT (OUTPATIENT)
Dept: REHABILITATION | Facility: HOSPITAL | Age: 83
End: 2023-09-07
Attending: INTERNAL MEDICINE
Payer: MEDICARE

## 2023-09-07 DIAGNOSIS — R26.2 DIFFICULTY WALKING: ICD-10-CM

## 2023-09-07 DIAGNOSIS — M54.9 BACK PAIN, UNSPECIFIED BACK LOCATION, UNSPECIFIED BACK PAIN LATERALITY, UNSPECIFIED CHRONICITY: ICD-10-CM

## 2023-09-07 DIAGNOSIS — M25.559 HIP PAIN, UNSPECIFIED LATERALITY: ICD-10-CM

## 2023-09-07 DIAGNOSIS — M62.81 WEAKNESS OF TRUNK MUSCULATURE: ICD-10-CM

## 2023-09-07 PROCEDURE — 97161 PT EVAL LOW COMPLEX 20 MIN: CPT | Mod: HCNC,PN

## 2023-09-07 PROCEDURE — 97110 THERAPEUTIC EXERCISES: CPT | Mod: HCNC,PN

## 2023-09-07 PROCEDURE — 97140 MANUAL THERAPY 1/> REGIONS: CPT | Mod: HCNC,PN

## 2023-09-07 NOTE — PROGRESS NOTES
[facility]  Physical Therapy Initial Evaluation    Name: Thad Maki  Clinic Number: [id number]    Therapy Diagnosis:   Encounter Diagnoses   Name Primary?    Hip pain, unspecified laterality     Back pain, unspecified back location, unspecified back pain laterality, unspecified chronicity      Physician: Guerrero Lino MD    Physician Orders: PT Eval and Treat   Medical Diagnosis from Referral:   M25.559 (ICD-10-CM) - Hip pain, unspecified laterality   M54.9 (ICD-10-CM) - Back pain, unspecified back location, unspecified back pain laterality, unspecified chronicity     Evaluation Date: 9/7/2023  Plan of Care Expiration: 12/7/23    Authorization Period Expiration: 7/9/24  Visit # / Visits authorized: 1/ 1      Time In: 0945  Time Out: 1045    Total Billable Time: 60 minutes    Precautions: Standard, Bur[place]ian speaking, needs     Subjective   Date of onset:  several years.     History of current condition:   Thad  is an year old 83 year old female presenting with c/o low back and right hip pain without lower extremity radiculopathy. She is Burundian speaking.  Her son is present as her .  She reports an onset several years ago in the 70's due to an injection in the area of her pain.  She states she has intermittent pain.  The patient denies a recent history of falls or use of an assistive device.  Her goal is to decreased pain.      Medical History:   Past Medical History:   Diagnosis Date    Anxiety     Depression     Gastritis     GERD (gastroesophageal reflux disease)     Hyperlipidemia     Hypothyroidism     Insomnia        Surgical History:   Thad Maki  has a past surgical history that includes Cholecystectomy (2015/2016) and Knee Arthroplasty.    Medications:   Thad has a current medication list which includes the following prescription(s): ammonium lactate, atorvastatin, calcium carbonate-vitamin d3, diclofenac sodium, duloxetine,  levothyroxine, mirtazapine, nortriptyline, and omeprazole.    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  No fracture or dislocation.  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       Objective     Observation: pt stands with a flat back posture  Gait: Pt presents ambulating with guarded gait, decreased lumbopelvic mobility.   Palpation: mild right QL and piriformis tenderness. No tenderness GT      Range of Motion/Strength:      Lumbar AROM: Degrees   Flexion WNL   Extension 12   Right side bending 10   Left side bending 10   Lumbar quadrant reveals: negative    AROM: Bilateral LE: Grossly WFL  MMT:  Right LE: 4-   Left LE: 4-  Abdominal Strength: 3+    Mobility: L/S p/a grade 1+  Flexibility: hip flexor length: fair      Hamstring Length: fair    Bed Mobility:Independent  Transfers: Independent    Special Tests:   -LLD:negative  -Slump: Negative  -SLR: negative  -Hip scour: negative  -Ronda's: Negative  -SIJ gapping and compression: Negative    FOTO: not in media    TREATMENT     Treatment Time In: 1015  Treatment Time Out: 1045    Total Treatment time separate from Evaluation: 30 minutes    Hipolita received therapeutic exercises to develop strength, endurance, ROM, flexibility, posture and core stabilization for 15 minutes including:   LTR x 20  Piriformis stretch with towel 30 sec x 4  Pelvic tilts x 20  Hip fall out x 20    Hipolita received the following manual therapy techniques:  were applied to the:  right hip, lumbar for 10 minutes, including:  Right hip/sacral long arc distraction    Education provided:   - HEP compliance    Written Home Exercises Provided: yes.    Exercises were reviewed and Hipolita was able to demonstrate them prior to the end of the session.  Hipolita demonstrated good  understanding of the education provided.     See EMR under Patient Instructions for exercises provided 9/7/2023.    Assessment     Pt presents with signs and symptoms consistent with referring diagnosis. Evaluation has determined a decrease in functional status and subjective and objective deficits that can be addressed by physical therapy intervention. Pt demonstrates pain limiting functional activities. Decreased flexibility and strength limiting normal movement patterns. Decreased segmental motion. Decreased postural strength and awareness. Positive special testing. Decreased participation in functional and recreational activities. Subjective and objective measures are addressed by goals in the plan of care.  Patient/family are involved in the development of these goals. Patient/family are educated about current injury and treatment.       Plan of care was dicussed with patient. Pt will benefit from skilled outpatient Physical Therapy to address the deficits stated above and in the chart below, provide pt/family education, and to maximize pt's level of independence. Pt's spiritual, cultural and educational needs considered and patient is agreeable to the plan of care and goals as stated below:     Pt prognosis is Good.  Anticipated Barriers for therapy: none    Medical Necessity is demonstrated by the following  History  Co-morbidities and personal factors that may impact the plan of care Co-morbidities:   Anxiety   Depression   Gastritis   GERD (gastroesophageal reflux disease)   Hyperlipidemia   Hypothyroidism   Insomnia       Personal Factors:   no deficits     low   Examination  Body Structures and Functions,  activity limitations and participation restrictions that may impact the plan of care Body Regions:   back  lower extremities    Body Systems:    gross symmetry  ROM  strength  transitions    Participation Restrictions:   Housework, yardwork, cooking    Activity limitations:   Learning and applying knowledge  no deficits    General Tasks and Commands  no deficits    Communication  communicating with/receiving spoken language    Mobility  walking    Self care  no deficits    Domestic Life  shopping  cooking  doing house work (cleaning house, washing dishes, laundry)    Interactions/Relationships  no deficits    Life Areas  no deficits    Community and Social Life  community life         low   Clinical Presentation stable and uncomplicated low   Decision Making/ Complexity Score: low     Goals:    Short Term Goals (4 Weeks):     1.Pt to increase strength by a 1/2 grade of muscles test to allow for improvement in functional activities such as performing chores.  2.Pt to improve range of motion by 25% to allow for improved functional mobility to allow for improvement in IADLs.   3.Pt to report compliance with HEP and demonstrate proper exercise technique to PT to show competence with self management of condition.  4.Decrease pain by 25% during functional activities.    Long Term Goals (12 Weeks):     1. Increase ROM to allow improved joint biomechanics during functional activities.   2.Increase trunk and lower extremity strength to within normal limits during functional activities.   3. Independent with home exercise program.   4. Full return to functional activities with manageable complaints.  5. Patient to demonstrate improved posture and body mechanics.  6. Decrease pain by 75% during functional activities.    Plan     Plan of care Certification: 9/7/2023 to 12/7/23.    Recommended Treatment Plan: 2 times per week for 12 weeks with treatments to consist of:  Neuromuscular and postural re-education,   training, therapeutic exercise, therapeutic activities,balance training, gait training, manual therapy, soft tissue mobilization, ROM exercises, Cardiovascular,  Postural stabilization, manual traction, spinal mobilization, moist heat, cryotherapy, electrical stimulation, ultrasound, home exercise education and planning.    Marcos Jasso, PT

## 2023-09-07 NOTE — PLAN OF CARE
[facility]  Physical Therapy Initial Evaluation    Name: Thad Maki  Clinic Number: [id number]    Therapy Diagnosis:   Encounter Diagnoses   Name Primary?    Hip pain, unspecified laterality     Back pain, unspecified back location, unspecified back pain laterality, unspecified chronicity      Physician: Guerrero Lino MD    Physician Orders: PT Eval and Treat   Medical Diagnosis from Referral:   M25.559 (ICD-10-CM) - Hip pain, unspecified laterality   M54.9 (ICD-10-CM) - Back pain, unspecified back location, unspecified back pain laterality, unspecified chronicity     Evaluation Date: 9/7/2023  Plan of Care Expiration: 12/7/23    Authorization Period Expiration: 7/9/24  Visit # / Visits authorized: 1/ 1      Time In: 0945  Time Out: 1045    Total Billable Time: 60 minutes    Precautions: Standard, Slovak speaking, needs     Subjective   Date of onset:  several years.     History of current condition:   Thad  is an year old 83 year old female presenting with c/o low back and right hip pain without lower extremity radiculopathy. She is Slovak speaking.  Her son is present as her .  She reports an onset several years ago in the 70's due to an injection in the area of her pain.  She states she has intermittent pain.  The patient denies a recent history of falls or use of an assistive device.  Her goal is to decreased pain.      Medical History:   Past Medical History:   Diagnosis Date    Anxiety     Depression     Gastritis     GERD (gastroesophageal reflux disease)     Hyperlipidemia     Hypothyroidism     Insomnia        Surgical History:   Thad Maki  has a past surgical history that includes Cholecystectomy (2015/2016) and Knee Arthroplasty.    Medications:   Thad has a current medication list which includes the following prescription(s): ammonium lactate, atorvastatin, calcium carbonate-vitamin d3, diclofenac sodium, duloxetine,  levothyroxine, mirtazapine, nortriptyline, and omeprazole.    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  No fracture or dislocation.  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       Objective     Observation: pt stands with a flat back posture  Gait: Pt presents ambulating with guarded gait, decreased lumbopelvic mobility.   Palpation: mild right QL and piriformis tenderness. No tenderness GT      Range of Motion/Strength:      Lumbar AROM: Degrees   Flexion WNL   Extension 12   Right side bending 10   Left side bending 10   Lumbar quadrant reveals: negative    AROM: Bilateral LE: Grossly WFL  MMT:  Right LE: 4-   Left LE: 4-  Abdominal Strength: 3+    Mobility: L/S p/a grade 1+  Flexibility: hip flexor length: fair      Hamstring Length: fair    Bed Mobility:Independent  Transfers: Independent    Special Tests:   -LLD:negative  -Slump: Negative  -SLR: negative  -Hip scour: negative  -Ronda's: Negative  -SIJ gapping and compression: Negative    FOTO: not in media    TREATMENT     Treatment Time In: 1015  Treatment Time Out: 1045    Total Treatment time separate from Evaluation: 30 minutes    Hipolita received therapeutic exercises to develop strength, endurance, ROM, flexibility, posture and core stabilization for 15 minutes including:   LTR x 20  Piriformis stretch with towel 30 sec x 4  Pelvic tilts x 20  Hip fall out x 20    Hipolita received the following manual therapy techniques:  were applied to the:  right hip, lumbar for 10 minutes, including:  Right hip/sacral long arc distraction    Education provided:   - HEP compliance    Written Home Exercises Provided: yes.    Exercises were reviewed and Hipolita was able to demonstrate them prior to the end of the session.  Hipolita demonstrated good  understanding of the education provided.     See EMR under Patient Instructions for exercises provided 9/7/2023.    Assessment     Pt presents with signs and symptoms consistent with referring diagnosis. Evaluation has determined a decrease in functional status and subjective and objective deficits that can be addressed by physical therapy intervention. Pt demonstrates pain limiting functional activities. Decreased flexibility and strength limiting normal movement patterns. Decreased segmental motion. Decreased postural strength and awareness. Positive special testing. Decreased participation in functional and recreational activities. Subjective and objective measures are addressed by goals in the plan of care.  Patient/family are involved in the development of these goals. Patient/family are educated about current injury and treatment.       Plan of care was dicussed with patient. Pt will benefit from skilled outpatient Physical Therapy to address the deficits stated above and in the chart below, provide pt/family education, and to maximize pt's level of independence. Pt's spiritual, cultural and educational needs considered and patient is agreeable to the plan of care and goals as stated below:     Pt prognosis is Good.  Anticipated Barriers for therapy: none    Medical Necessity is demonstrated by the following  History  Co-morbidities and personal factors that may impact the plan of care Co-morbidities:   Anxiety   Depression   Gastritis   GERD (gastroesophageal reflux disease)   Hyperlipidemia   Hypothyroidism   Insomnia       Personal Factors:   no deficits     low   Examination  Body Structures and Functions,  activity limitations and participation restrictions that may impact the plan of care Body Regions:   back  lower extremities    Body Systems:    gross symmetry  ROM  strength  transitions    Participation Restrictions:   Housework, yardwork, cooking    Activity limitations:   Learning and applying knowledge  no deficits    General Tasks and Commands  no deficits    Communication  communicating with/receiving spoken language    Mobility  walking    Self care  no deficits    Domestic Life  shopping  cooking  doing house work (cleaning house, washing dishes, laundry)    Interactions/Relationships  no deficits    Life Areas  no deficits    Community and Social Life  community life         low   Clinical Presentation stable and uncomplicated low   Decision Making/ Complexity Score: low     Goals:    Short Term Goals (4 Weeks):     1.Pt to increase strength by a 1/2 grade of muscles test to allow for improvement in functional activities such as performing chores.  2.Pt to improve range of motion by 25% to allow for improved functional mobility to allow for improvement in IADLs.   3.Pt to report compliance with HEP and demonstrate proper exercise technique to PT to show competence with self management of condition.  4.Decrease pain by 25% during functional activities.    Long Term Goals (12 Weeks):     1. Increase ROM to allow improved joint biomechanics during functional activities.   2.Increase trunk and lower extremity strength to within normal limits during functional activities.   3. Independent with home exercise program.   4. Full return to functional activities with manageable complaints.  5. Patient to demonstrate improved posture and body mechanics.  6. Decrease pain by 75% during functional activities.    Plan     Plan of care Certification: 9/7/2023 to 12/7/23.    Recommended Treatment Plan: 2 times per week for 12 weeks with treatments to consist of:  Neuromuscular and postural re-education,   training, therapeutic exercise, therapeutic activities,balance training, gait training, manual therapy, soft tissue mobilization, ROM exercises, Cardiovascular,  Postural stabilization, manual traction, spinal mobilization, moist heat, cryotherapy, electrical stimulation, ultrasound, home exercise education and planning.    Marcos Jasso, PT

## 2023-09-18 ENCOUNTER — CLINICAL SUPPORT (OUTPATIENT)
Dept: REHABILITATION | Facility: HOSPITAL | Age: 83
End: 2023-09-18
Payer: MEDICARE

## 2023-09-18 DIAGNOSIS — M62.81 WEAKNESS OF TRUNK MUSCULATURE: Primary | ICD-10-CM

## 2023-09-18 DIAGNOSIS — R26.2 DIFFICULTY WALKING: ICD-10-CM

## 2023-09-18 PROCEDURE — 97110 THERAPEUTIC EXERCISES: CPT | Mod: HCNC,PN,CQ

## 2023-09-18 NOTE — PROGRESS NOTES
"  Physical Therapy Daily Treatment Note     Name: Thad Maki  Clinic Number: [id number]    Therapy Diagnosis:   Encounter Diagnoses   Name Primary?    Weakness of trunk musculature Yes    Difficulty walking      Physician: Guerrero Lino MD    Visit Date: 9/18/2023    Physician Orders: ***  Medical Diagnosis: ***  Evaluation Date: ***  Authorization Period Expiration: ***  Plan of Care Certification Period: ***  Visit #/Visits authorized: ***/ ***     PN Due:      Time In: ***  Time Out: ***  Total Billable Time: *** minutes    Precautions: {IP WOUND PRECAUTIONS OHS:97953}    Subjective     Pt reports: ***.  She {Actions; was/was not:43927} compliant with home exercise program.  Response to previous treatment: ***  Functional change: ***    Pain: {0-10:28400::"0"}/10  Location: {RIGHT/LEFT/BILATERAL:26350} {LOCATION ON BODY:30814}     Objective     Hipolita received therapeutic exercises to develop {AMB PT PROGRESS OBJECTIVE:41959} for *** minutes including:  ***    Hipolita received the following manual therapy techniques: {AMB PT PROGRESS MANUAL THERAPY:65079} were applied to the: *** for *** minutes, including:  ***    Hipolita participated in neuromuscular re-education activities to improve: {AMB PT PROGRESS NEURO RE-ED:54430} for *** minutes. The following activities were included:  ***    Hipolita participated in dynamic functional therapeutic activities to improve functional performance for ***  minutes, including:  ***    Hipolita participated in gait training to improve functional mobility and safety for ***  minutes, including:  ***    Hipolita received the following direct contact modalities after being cleared for contraindications: {AMB PT PROGRESS DIRECT CONTACT MODES:94209}    Hipolita received the following supervised modalities after being cleared for contradictions: {AMB PT SUPERVISED MODES:77138}    Hipolita received hot pack for *** minutes to ***.    Hipolita received cold pack for *** " "minutes to ***.      Home Exercises Provided and Patient Education Provided     Education provided:   Cont to perform HEP as provided.     Written Home Exercises Provided: {Blank single:92342::"yes","Patient instructed to cont prior HEP"}.  Exercises were reviewed and Hipolita was able to demonstrate them prior to the end of the session.  Hipolita demonstrated {Desc; good/fair/poor:82924} understanding of the education provided.     See EMR under {Blank single:39439::"Media","Patient Instructions"} for exercises provided {Blank single:57148::"9/18/2023","prior visit"}.    Assessment     ***  Hipolita {IS/IS NOT:26719} progressing well towards her goals.   Pt prognosis is {REHAB PROGNOSIS OHS:76735}.     Pt will continue to benefit from skilled outpatient physical therapy to address the deficits listed in the problem list box on initial evaluation, provide pt/family education and to maximize pt's level of independence in the home and community environment.     Pt's spiritual, cultural and educational needs considered and pt agreeable to plan of care and goals.    Anticipated barriers to physical therapy: ***    Goals: ***    Plan     Certification date:     Cont skilled PT session towards PT and patient's goals.    Leonila Robb, PTA   09/18/2023      "

## 2023-09-21 ENCOUNTER — CLINICAL SUPPORT (OUTPATIENT)
Dept: REHABILITATION | Facility: HOSPITAL | Age: 83
End: 2023-09-21
Payer: MEDICARE

## 2023-09-21 DIAGNOSIS — M62.81 WEAKNESS OF TRUNK MUSCULATURE: Primary | ICD-10-CM

## 2023-09-21 DIAGNOSIS — M54.9 BACK PAIN, UNSPECIFIED BACK LOCATION, UNSPECIFIED BACK PAIN LATERALITY, UNSPECIFIED CHRONICITY: ICD-10-CM

## 2023-09-21 DIAGNOSIS — R26.2 DIFFICULTY WALKING: ICD-10-CM

## 2023-09-21 DIAGNOSIS — M25.559 HIP PAIN, UNSPECIFIED LATERALITY: ICD-10-CM

## 2023-09-21 PROCEDURE — 97110 THERAPEUTIC EXERCISES: CPT | Mod: HCNC,PN,CQ

## 2023-09-21 NOTE — PROGRESS NOTES
"Physical Therapy Daily Treatment Note     Name: Thad Maki  Clinic Number: [id number]    Therapy Diagnosis:   Encounter Diagnoses   Name Primary?    Weakness of trunk musculature Yes    Difficulty walking     Hip pain, unspecified laterality     Back pain, unspecified back location, unspecified back pain laterality, unspecified chronicity      Physician: Guerrero Lino MD    Visit Date: 9/21/2023    Physician Orders: PT Eval and Treat   Medical Diagnosis from Referral:   M25.559 (ICD-10-CM) - Hip pain, unspecified laterality   M54.9 (ICD-10-CM) - Back pain, unspecified back location, unspecified back pain laterality, unspecified chronicity      Evaluation Date: 9/7/2023  Plan of Care Expiration: 12/7/23     Authorization Period Expiration: 7/9/24  Visit # / Visits authorized: 2/ 20     PTA Visits: 1/5  Time In: 8:35am (pt arrives late)   Time Out: 9:15am     Total Billable Time: 40 minutes     Precautions: Standard, Pashto speaking, needs       Subjective     Pt reports: she feels a little sore from previous session, however, she finds the exercises is helping with decreasing her pain.     She was somewhat compliant with home exercise program.  Response to previous treatment: sore   Functional change: ongoing    Pain: 0/10  Location: right lumbar     Objective     Hipolita received therapeutic exercises to develop strength, endurance, ROM, flexibility, posture, and core stabilization for 40 minutes including:    *Needs demonstration and visual  on green cart in today's session.     NuStep 10 min  HL Hip Add w/ ball 20x  Supine Clamshells w/ RTB 20x  Supine Pelvic tilts 20x3"  LTR x 20  Bridges 2x10  Hip fall out 20x/ea   Piriformis stretch with towel 30 sec x 4      Hipolita received the following manual therapy techniques: Joint mobilizations were applied to the: right hip, lumbar for 00 minutes, including:  Right hip/sacral long arc distraction    Home Exercises Provided and Patient " Education Provided     Education provided:   Cont to perform HEP as provided.     Written Home Exercises Provided: Patient instructed to cont prior HEP.  Exercises were reviewed and Thad was able to demonstrate them prior to the end of the session.  Hipbárbarata demonstrated good  understanding of the education provided.     See EMR under Patient Instructions for exercises provided prior visit.    Assessment   Patient with back and hip pain, however, since last visit it appears to help eases her pain level a little bit. Continued with the prescribed exercises. Verbal cues and demonstration used for correct proper techniques and muscles activation. Increase resistance from yellow to red theraband today. No complaints during or after therapy. Printed home exercise program and review with patient. Therapist spoke with patient and patient's son regarding her schedule. Patient son states that they can maybe comes once a week for therapy and that one of the brother will be out of the country. Therefore, will schedule patient as appropriate.     Thad Is progressing well towards her goals.   Pt prognosis is Good.     Pt will continue to benefit from skilled outpatient physical therapy to address the deficits listed in the problem list box on initial evaluation, provide pt/family education and to maximize pt's level of independence in the home and community environment.     Pt's spiritual, cultural and educational needs considered and pt agreeable to plan of care and goals.    Anticipated barriers to physical therapy: none    Goals: Short Term Goals (4 Weeks):      1.Pt to increase strength by a 1/2 grade of muscles test to allow for improvement in functional activities such as performing chores.  2.Pt to improve range of motion by 25% to allow for improved functional mobility to allow for improvement in IADLs.   3.Pt to report compliance with HEP and demonstrate proper exercise technique to PT to show competence with  self management of condition.  4.Decrease pain by 25% during functional activities.     Long Term Goals (12 Weeks):      1. Increase ROM to allow improved joint biomechanics during functional activities.   2.Increase trunk and lower extremity strength to within normal limits during functional activities.   3. Independent with home exercise program.   4. Full return to functional activities with manageable complaints.  5. Patient to demonstrate improved posture and body mechanics.  6. Decrease pain by 75% during functional activities.    Plan     Cont skilled PT session towards PT and patient's goals.    Pricila Tabares, PTA   09/21/2023

## 2023-10-15 DIAGNOSIS — E78.5 DYSLIPIDEMIA: ICD-10-CM

## 2023-10-15 NOTE — TELEPHONE ENCOUNTER
No care due was identified.  Health [facility] Embedded Care Due Messages. Reference number: [id number].   10/15/2023 4:02:59 PM CDT

## 2023-10-16 NOTE — TELEPHONE ENCOUNTER
Refill Routing Note   Medication(s) are not appropriate for processing by [facility] for the following reason(s):      No active prescription written by provider    [facility] action(s):  Defer Care Due:  None identified            Appointments  past 12m or future 3m with PCP    Date Provider   Last Visit   7/10/2023 Guerrero Lino MD   Next Visit   10/23/2023 Guerrero Lino MD   ED visits in past 90 days: 0        Note composed:3:49 PM 10/16/2023

## 2023-10-17 RX ORDER — ATORVASTATIN CALCIUM 10 MG/1
10 TABLET, FILM COATED ORAL
Qty: 90 TABLET | Refills: 3 | Status: SHIPPED | OUTPATIENT
Start: 2023-10-17

## 2023-10-23 ENCOUNTER — OFFICE VISIT (OUTPATIENT)
Dept: FAMILY MEDICINE | Facility: CLINIC | Age: 83
End: 2023-10-23
Payer: MEDICARE

## 2023-10-23 VITALS
WEIGHT: 189.63 LBS | TEMPERATURE: 98 F | DIASTOLIC BLOOD PRESSURE: 74 MMHG | HEART RATE: 90 BPM | OXYGEN SATURATION: 96 % | HEIGHT: 62 IN | BODY MASS INDEX: 34.89 KG/M2 | SYSTOLIC BLOOD PRESSURE: 112 MMHG

## 2023-10-23 DIAGNOSIS — Z00.00 HEALTHCARE MAINTENANCE: ICD-10-CM

## 2023-10-23 DIAGNOSIS — G56.03 BILATERAL CARPAL TUNNEL SYNDROME: ICD-10-CM

## 2023-10-23 DIAGNOSIS — E03.9 ACQUIRED HYPOTHYROIDISM: ICD-10-CM

## 2023-10-23 DIAGNOSIS — M16.10 HIP ARTHRITIS: ICD-10-CM

## 2023-10-23 DIAGNOSIS — R09.82 POSTNASAL DRIP: ICD-10-CM

## 2023-10-23 DIAGNOSIS — I70.0 AORTIC ATHEROSCLEROSIS: ICD-10-CM

## 2023-10-23 DIAGNOSIS — E78.2 MIXED HYPERLIPIDEMIA: ICD-10-CM

## 2023-10-23 DIAGNOSIS — R07.9 CHEST PAIN, UNSPECIFIED TYPE: Primary | ICD-10-CM

## 2023-10-23 DIAGNOSIS — K21.9 GASTROESOPHAGEAL REFLUX DISEASE, UNSPECIFIED WHETHER ESOPHAGITIS PRESENT: ICD-10-CM

## 2023-10-23 DIAGNOSIS — G47.00 INSOMNIA, UNSPECIFIED TYPE: ICD-10-CM

## 2023-10-23 PROCEDURE — 3074F SYST BP LT 130 MM HG: CPT | Mod: HCNC,CPTII,S$GLB, | Performed by: INTERNAL MEDICINE

## 2023-10-23 PROCEDURE — 99214 OFFICE O/P EST MOD 30 MIN: CPT | Mod: HCNC,25,S$GLB, | Performed by: INTERNAL MEDICINE

## 2023-10-23 PROCEDURE — 1126F PR PAIN SEVERITY QUANTIFIED, NO PAIN PRESENT: ICD-10-PCS | Mod: HCNC,CPTII,S$GLB, | Performed by: INTERNAL MEDICINE

## 2023-10-23 PROCEDURE — 1159F MED LIST DOCD IN RCRD: CPT | Mod: HCNC,CPTII,S$GLB, | Performed by: INTERNAL MEDICINE

## 2023-10-23 PROCEDURE — G0008 ADMIN INFLUENZA VIRUS VAC: HCPCS | Mod: HCNC,S$GLB,, | Performed by: INTERNAL MEDICINE

## 2023-10-23 PROCEDURE — 90694 VACC AIIV4 NO PRSRV 0.5ML IM: CPT | Mod: HCNC,S$GLB,, | Performed by: INTERNAL MEDICINE

## 2023-10-23 PROCEDURE — 1160F RVW MEDS BY RX/DR IN RCRD: CPT | Mod: HCNC,CPTII,S$GLB, | Performed by: INTERNAL MEDICINE

## 2023-10-23 PROCEDURE — 1101F PR PT FALLS ASSESS DOC 0-1 FALLS W/OUT INJ PAST YR: ICD-10-PCS | Mod: HCNC,CPTII,S$GLB, | Performed by: INTERNAL MEDICINE

## 2023-10-23 PROCEDURE — 1126F AMNT PAIN NOTED NONE PRSNT: CPT | Mod: HCNC,CPTII,S$GLB, | Performed by: INTERNAL MEDICINE

## 2023-10-23 PROCEDURE — G0008 FLU VACCINE - QUADRIVALENT - ADJUVANTED: ICD-10-PCS | Mod: HCNC,S$GLB,, | Performed by: INTERNAL MEDICINE

## 2023-10-23 PROCEDURE — 3078F PR MOST RECENT DIASTOLIC BLOOD PRESSURE < 80 MM HG: ICD-10-PCS | Mod: HCNC,CPTII,S$GLB, | Performed by: INTERNAL MEDICINE

## 2023-10-23 PROCEDURE — 3078F DIAST BP <80 MM HG: CPT | Mod: HCNC,CPTII,S$GLB, | Performed by: INTERNAL MEDICINE

## 2023-10-23 PROCEDURE — 93010 ELECTROCARDIOGRAM REPORT: CPT | Mod: HCNC,S$GLB,, | Performed by: INTERNAL MEDICINE

## 2023-10-23 PROCEDURE — 93005 ELECTROCARDIOGRAM TRACING: CPT | Mod: HCNC,S$GLB,, | Performed by: INTERNAL MEDICINE

## 2023-10-23 PROCEDURE — 3288F PR FALLS RISK ASSESSMENT DOCUMENTED: ICD-10-PCS | Mod: HCNC,CPTII,S$GLB, | Performed by: INTERNAL MEDICINE

## 2023-10-23 PROCEDURE — 99214 PR OFFICE/OUTPT VISIT, EST, LEVL IV, 30-39 MIN: ICD-10-PCS | Mod: HCNC,25,S$GLB, | Performed by: INTERNAL MEDICINE

## 2023-10-23 PROCEDURE — 99999 PR PBB SHADOW E&M-EST. PATIENT-LVL V: CPT | Mod: PBBFAC,HCNC,, | Performed by: INTERNAL MEDICINE

## 2023-10-23 PROCEDURE — 3288F FALL RISK ASSESSMENT DOCD: CPT | Mod: HCNC,CPTII,S$GLB, | Performed by: INTERNAL MEDICINE

## 2023-10-23 PROCEDURE — 1160F PR REVIEW ALL MEDS BY PRESCRIBER/CLIN PHARMACIST DOCUMENTED: ICD-10-PCS | Mod: HCNC,CPTII,S$GLB, | Performed by: INTERNAL MEDICINE

## 2023-10-23 PROCEDURE — 1101F PT FALLS ASSESS-DOCD LE1/YR: CPT | Mod: HCNC,CPTII,S$GLB, | Performed by: INTERNAL MEDICINE

## 2023-10-23 PROCEDURE — 93005 EKG 12-LEAD: ICD-10-PCS | Mod: HCNC,S$GLB,, | Performed by: INTERNAL MEDICINE

## 2023-10-23 PROCEDURE — 1159F PR MEDICATION LIST DOCUMENTED IN MEDICAL RECORD: ICD-10-PCS | Mod: HCNC,CPTII,S$GLB, | Performed by: INTERNAL MEDICINE

## 2023-10-23 PROCEDURE — 93010 EKG 12-LEAD: ICD-10-PCS | Mod: HCNC,S$GLB,, | Performed by: INTERNAL MEDICINE

## 2023-10-23 PROCEDURE — 99999 PR PBB SHADOW E&M-EST. PATIENT-LVL V: ICD-10-PCS | Mod: PBBFAC,HCNC,, | Performed by: INTERNAL MEDICINE

## 2023-10-23 PROCEDURE — 3074F PR MOST RECENT SYSTOLIC BLOOD PRESSURE < 130 MM HG: ICD-10-PCS | Mod: HCNC,CPTII,S$GLB, | Performed by: INTERNAL MEDICINE

## 2023-10-23 PROCEDURE — 90694 FLU VACCINE - QUADRIVALENT - ADJUVANTED: ICD-10-PCS | Mod: HCNC,S$GLB,, | Performed by: INTERNAL MEDICINE

## 2023-10-23 RX ORDER — FLUTICASONE PROPIONATE 50 MCG
1 SPRAY, SUSPENSION (ML) NASAL DAILY
Qty: 9.9 ML | Refills: 2 | Status: SHIPPED | OUTPATIENT
Start: 2023-10-23

## 2023-10-23 RX ORDER — NORTRIPTYLINE HYDROCHLORIDE 50 MG/1
50 CAPSULE ORAL NIGHTLY
Qty: 90 CAPSULE | Refills: 3 | Status: SHIPPED | OUTPATIENT
Start: 2023-10-23

## 2023-10-23 RX ORDER — OMEPRAZOLE 40 MG/1
40 CAPSULE, DELAYED RELEASE ORAL DAILY
Qty: 30 CAPSULE | Refills: 2 | Status: SHIPPED | OUTPATIENT
Start: 2023-10-23 | End: 2024-01-21

## 2023-10-23 RX ORDER — LEVOTHYROXINE SODIUM 100 UG/1
100 TABLET ORAL
Qty: 90 TABLET | Refills: 3 | Status: SHIPPED | OUTPATIENT
Start: 2023-10-23

## 2023-10-23 RX ORDER — TIZANIDINE 2 MG/1
2 TABLET ORAL EVERY 8 HOURS PRN
Qty: 60 TABLET | Refills: 2 | Status: SHIPPED | OUTPATIENT
Start: 2023-10-23 | End: 2023-12-22

## 2023-10-23 RX ORDER — CETIRIZINE HYDROCHLORIDE 10 MG/1
10 TABLET ORAL DAILY
Qty: 90 TABLET | Refills: 0 | Status: SHIPPED | OUTPATIENT
Start: 2023-10-23 | End: 2024-01-21

## 2023-10-23 NOTE — PROGRESS NOTES
HISTORY OF PRESENT ILLNESS:  Thad Maki is a 83 y.o. female who presents to the clinic today for Chest Pain (X30 years) and Numbness (LESLIE hands and Rt leg and weakness x6 months)    Last seen by me 7/2023.  Accompanied by son.  Declined translation service and wishes for son to translate.    Attending PT for hip pain.    Numbness in both hands  H/o carpal tunnel surgery around 2000.  Into the palms and all fingers.    Chest pain  Feels in the right jaw and moves down to upper chest then down to abdominal pain.  Drinks some water and then helps it.  Sits for a little bit and this helps.  Not brought on by exertion.  Occurs once every 3-4 months.    PAST MEDICAL HISTORY:  Past Medical History:   Diagnosis Date    Anxiety     Depression     Gastritis     GERD (gastroesophageal reflux disease)     Hyperlipidemia     Hypothyroidism     Insomnia        PAST SURGICAL HISTORY:  Past Surgical History:   Procedure Laterality Date    CHOLECYSTECTOMY  2015/2016    KNEE ARTHROPLASTY      CAN'T REMEMBER WHAT SIDE OR WHEN       SOCIAL HISTORY:  Social History     Socioeconomic History    Marital status:    Tobacco Use    Smoking status: Never    Smokeless tobacco: Never   Substance and Sexual Activity    Alcohol use: Never    Drug use: Never    Sexual activity: Not Currently     Partners: Male       FAMILY HISTORY:  No family history on file.    ALLERGIES AND MEDICATIONS: updated and reviewed.  Review of patient's allergies indicates:  No Known Allergies  Medication List with Changes/Refills   Current Medications    AMMONIUM LACTATE (LAC-HYDRIN) 12 % LOTION    Apply topically to itchy areas after bathing once daily    ATORVASTATIN (LIPITOR) 10 MG TABLET    Take 1 tablet by mouth once daily    CALCIUM CARBONATE-VITAMIN D3 600 MG-20 MCG (800 UNIT) TAB    Take 1 tablet by mouth once daily.    DICLOFENAC SODIUM (VOLTAREN) 1 % GEL    Apply 2 grams to left shoulder QID-PRN for pain    DULOXETINE (CYMBALTA) 30 MG CAPSULE     Take 1 capsule (30 mg total) by mouth once daily.    LEVOTHYROXINE (EUTHYROX) 100 MCG TABLET    Take 1 tablet (100 mcg total) by mouth before breakfast.    MIRTAZAPINE (REMERON) 30 MG TABLET    Take 1 tablet (30 mg total) by mouth every evening.    NORTRIPTYLINE (PAMELOR) 50 MG CAPSULE    Take 1 capsule (50 mg total) by mouth every evening.    OMEPRAZOLE (PRILOSEC) 20 MG CAPSULE    Take 1 capsule (20 mg total) by mouth 2 (two) times daily before meals.          CARE TEAM:  Patient Care Team:  Guerrero Lino MD as PCP - General (Internal Medicine)         REVIEW OF SYSTEMS:  Review of Systems   Constitutional:  Negative for chills and fever.   HENT:  Negative for congestion and postnasal drip.    Eyes:  Negative for photophobia and visual disturbance.   Respiratory:  Negative for cough, shortness of breath and wheezing.    Cardiovascular:  Positive for chest pain. Negative for palpitations and leg swelling.   Gastrointestinal:  Negative for abdominal pain, nausea and vomiting.   Genitourinary:  Negative for dysuria and frequency.   Musculoskeletal:  Negative for arthralgias and back pain.   Neurological:  Positive for numbness. Negative for light-headedness and headaches.   Psychiatric/Behavioral:  Negative for sleep disturbance. The patient is not nervous/anxious.    All other systems reviewed and are negative.        PHYSICAL EXAM:   Vitals:    10/23/23 0924   BP: 112/74   Pulse: 90   Temp: 98.1 °F (36.7 °C)             Body mass index is 34.68 kg/m².     General appearance - alert, well appearing, and in no distress and obese  Mental status - normal mood, behavior, speech, dress, motor activity, and thought processes  Eyes - sclera anicteric, left eye normal, right eye normal  Chest - clear to auscultation, no wheezes, rales or rhonchi, symmetric air entry  Heart - normal rate, regular rhythm, normal S1, S2, no murmurs, rubs, clicks or gallops  Musculoskeletal - no joint tenderness, deformity or  swelling  Extremities - peripheral pulses normal, no pedal edema, no clubbing or cyanosis      ASSESSMENT AND PLAN:  Chest pain, unspecified type  -     EKG 12-lead - 1st degree AV block noted, otherwise NSR, no TWI or ST changes  -     Nuclear Stress - Cardiology Interpreted; Future  -     X-Ray Chest PA And Lateral; Future; Expected date: 10/23/2023  -     Echo; Future    Gastroesophageal reflux disease, unspecified whether esophagitis present  -     omeprazole (PRILOSEC) 40 MG capsule; Take 1 capsule (40 mg total) by mouth once daily.  Dispense: 30 capsule; Refill: 2    Bilateral carpal tunnel syndrome  -     Ambulatory referral/consult to Hand Surgery; Future; Expected date: 10/30/2023    Hip arthritis  -     tiZANidine (ZANAFLEX) 2 MG tablet; Take 1 tablet (2 mg total) by mouth every 8 (eight) hours as needed (muscle spasm).  Dispense: 60 tablet; Refill: 2    Acquired hypothyroidism  -     levothyroxine (EUTHYROX) 100 MCG tablet; Take 1 tablet (100 mcg total) by mouth before breakfast.  Dispense: 90 tablet; Refill: 3    Mixed hyperlipidemia  Aortic atherosclerosis        - Stable on statin.    Postnasal drip  -     cetirizine (ZYRTEC) 10 MG tablet; Take 1 tablet (10 mg total) by mouth once daily.  Dispense: 90 tablet; Refill: 0  -     fluticasone propionate (FLONASE) 50 mcg/actuation nasal spray; 1 spray (50 mcg total) by Each Nostril route once daily.  Dispense: 9.9 mL; Refill: 2    Insomnia, unspecified type  -     nortriptyline (PAMELOR) 50 MG capsule; Take 1 capsule (50 mg total) by mouth every evening.  Dispense: 90 capsule; Refill: 3    Healthcare maintenance  -     Influenza (FLUAD) - Quadrivalent (Adjuvanted) *Preferred* (65+) (PF); Future              Follow up 3 months or sooner as needed.

## 2023-11-02 ENCOUNTER — CLINICAL SUPPORT (OUTPATIENT)
Dept: REHABILITATION | Facility: HOSPITAL | Age: 83
End: 2023-11-02
Payer: MEDICARE

## 2023-11-02 DIAGNOSIS — M62.81 WEAKNESS OF TRUNK MUSCULATURE: ICD-10-CM

## 2023-11-02 DIAGNOSIS — M25.559 HIP PAIN, UNSPECIFIED LATERALITY: ICD-10-CM

## 2023-11-02 DIAGNOSIS — M54.9 BACK PAIN, UNSPECIFIED BACK LOCATION, UNSPECIFIED BACK PAIN LATERALITY, UNSPECIFIED CHRONICITY: Primary | ICD-10-CM

## 2023-11-02 DIAGNOSIS — R26.2 DIFFICULTY WALKING: ICD-10-CM

## 2023-11-02 PROCEDURE — 97110 THERAPEUTIC EXERCISES: CPT | Mod: HCNC,PN,CQ

## 2023-11-02 NOTE — PROGRESS NOTES
Physical Therapy Daily Treatment/Re-assessment Note     Name: Thad Maki  Clinic Number: [id number]    Therapy Diagnosis:   Encounter Diagnoses   Name Primary?    Weakness of trunk musculature     Difficulty walking     Hip pain, unspecified laterality     Back pain, unspecified back location, unspecified back pain laterality, unspecified chronicity Yes     Physician: Guerrero Lino MD    Visit Date: 11/2/2023    Physician Orders: PT Eval and Treat   Medical Diagnosis from Referral:   M25.559 (ICD-10-CM) - Hip pain, unspecified laterality   M54.9 (ICD-10-CM) - Back pain, unspecified back location, unspecified back pain laterality, unspecified chronicity      Evaluation Date: 9/7/2023  Plan of Care Expiration: 12/7/23     Authorization Period Expiration: 7/9/24  Visit # / Visits authorized: 2/ 20  Progress Note Due: 10/7/2023 (re-assess completed today)      PTA Visits: 3/5  Time In: 0930am  Time Out: 1025am     Total Billable Time: 55 minutes     Precautions: Standard, Korean speaking, needs       Subjective     Pt reports: she hasn't been do too much at home. When she do house chores, she takes a break in between. So far, her back has not been hurting much.     She was somewhat compliant with home exercise program.  Response to previous treatment: good   Functional change: ongoing    Pain: 0/10  Location: right lumbar     Objective         Palpation: slight right QL and piriformis tenderness. No tenderness GT        Range of Motion/Strength:       Lumbar AROM: Degrees   Flexion WNL   Extension 12   Right side bending 12   Left side bending 12   Lumbar quadrant reveals: negative     AROM: Bilateral LE: Grossly WFL  MMT:  Right LE: 4   Left LE: 4  Abdominal Strength: 4-     Hipolita received therapeutic exercises to develop strength, endurance, ROM, flexibility, posture, and core stabilization for 55 minutes including:    *Needs demonstration and visual  on green cart in today's session.      NuStep 10 min  HL Hip Add w/ ball 30x  Supine Clamshells w/ RTB 30x  Supine Pelvic tilts for 2 minutes   LTR for 2 minutes   Bridges 2x10  SLR bilateral, x20   Standing hip abduction       Hipolita received the following manual therapy techniques: Joint mobilizations were applied to the: right hip, lumbar for 00 minutes, including:    Right hip/sacral long arc distraction    Home Exercises Provided and Patient Education Provided     Education provided:   Cont to perform HEP as provided.     Written Home Exercises Provided: Patient instructed to cont prior HEP.  Exercises were reviewed and Hipolita was able to demonstrate them prior to the end of the session.  Hipolita demonstrated good  understanding of the education provided.     See EMR under Patient Instructions for exercises provided prior visit.    Assessment   Patient appears to be showing decrease low back pain compared to previous therapy session. Continued with low back stretches and lower extremity strengthening. No concerns of pain throughout session. Patient required demonstration for proper techniques and translation during session. Re-assess were completed by supervising PT in today's visit.     Thad Is progressing well towards her goals.   Pt prognosis is Good.     Pt will continue to benefit from skilled outpatient physical therapy to address the deficits listed in the problem list box on initial evaluation, provide pt/family education and to maximize pt's level of independence in the home and community environment.     Pt's spiritual, cultural and educational needs considered and pt agreeable to plan of care and goals.    Anticipated barriers to physical therapy: none    Goals: Short Term Goals (4 Weeks):  updated 11/2/23  partially MET     1.Pt to increase strength by a 1/2 grade of muscles test to allow for improvement in functional activities such as performing chores.  2.Pt to improve range of motion by 25% to allow for improved  functional mobility to allow for improvement in IADLs.   3.Pt to report compliance with HEP and demonstrate proper exercise technique to PT to show competence with self management of condition.  MET  4.Decrease pain by 25% during functional activities.     Long Term Goals (12 Weeks):      1. Increase ROM to allow improved joint biomechanics during functional activities.   2.Increase trunk and lower extremity strength to within normal limits during functional activities.   3. Independent with home exercise program.   4. Full return to functional activities with manageable complaints.  5. Patient to demonstrate improved posture and body mechanics.  6. Decrease pain by 75% during functional activities.    Plan     Pt will continue Home program for next couple weeks and son will call to schedule future visits if neededl.     Pricila Tabares, PTA   11/02/2023

## 2023-11-06 ENCOUNTER — TELEPHONE (OUTPATIENT)
Dept: FAMILY MEDICINE | Facility: CLINIC | Age: 83
End: 2023-11-06
Payer: MEDICARE

## 2023-11-06 NOTE — TELEPHONE ENCOUNTER
Called pt home phone out of service. Call son Etahn, hes out of town and would like a call tomorrow 11/7/23 to translate for his mother.

## 2023-11-06 NOTE — TELEPHONE ENCOUNTER
----- Message from Guerrero Lino MD sent at 11/2/2023 12:01 PM CDT -----  Please call to advise EKG showed what is called 1st degree block.  This does not require any new medication at this time but I do suggest she schedule her stress test and echo given symptoms reported at clinic.  Please assist in scheduling.

## 2023-11-09 NOTE — TELEPHONE ENCOUNTER
I have called pt for the 3rd time on her home and cell phone. A message has been left via  by  Henrique ID#[id number].

## 2023-11-17 ENCOUNTER — PATIENT OUTREACH (OUTPATIENT)
Dept: ADMINISTRATIVE | Facility: HOSPITAL | Age: 83
End: 2023-11-17
Payer: MEDICARE

## 2023-12-01 ENCOUNTER — HOSPITAL ENCOUNTER (OUTPATIENT)
Dept: RADIOLOGY | Facility: HOSPITAL | Age: 83
Discharge: HOME | End: 2023-12-01
Attending: INTERNAL MEDICINE
Payer: MEDICARE

## 2023-12-01 ENCOUNTER — HOSPITAL ENCOUNTER (OUTPATIENT)
Dept: CARDIOLOGY | Facility: HOSPITAL | Age: 83
Discharge: HOME | End: 2023-12-01
Attending: INTERNAL MEDICINE
Payer: MEDICARE

## 2023-12-01 DIAGNOSIS — R07.9 CHEST PAIN, UNSPECIFIED TYPE: ICD-10-CM

## 2023-12-01 LAB
ASCENDING AORTA: 3.13 CM
AV INDEX (PROSTH): 0.74
AV MEAN GRADIENT: 6 MMHG
AV PEAK GRADIENT: 8 MMHG
AV VALVE AREA BY VELOCITY RATIO: 2 CM²
AV VALVE AREA: 2.04 CM²
AV VELOCITY RATIO: 0.73
CV ECHO LV RWT: 0.46 CM
CV STRESS BASE HR: 86 BPM
DIASTOLIC BLOOD PRESSURE: 87 MMHG
DOP CALC AO PEAK VEL: 1.43 M/S
DOP CALC AO VTI: 31.9 CM
DOP CALC LVOT AREA: 2.7 CM2
DOP CALC LVOT DIAMETER: 1.87 CM
DOP CALC LVOT PEAK VEL: 1.04 M/S
DOP CALC LVOT STROKE VOLUME: 65.06 CM3
DOP CALCLVOT PEAK VEL VTI: 23.7 CM
E/E' RATIO: 11.75 M/S
ECHO LV POSTERIOR WALL: 0.8 CM (ref 0.6–1.1)
FRACTIONAL SHORTENING: 32 % (ref 28–44)
INTERVENTRICULAR SEPTUM: 0.78 CM (ref 0.6–1.1)
IVC DIAMETER: 1.34 CM
IVRT: 97.05 MSEC
LA MAJOR: 5.6 CM
LA MINOR: 3.85 CM
LA WIDTH: 3.9 CM
LEFT ATRIUM SIZE: 3.27 CM
LEFT ATRIUM VOLUME: 49.46 CM3
LEFT INTERNAL DIMENSION IN SYSTOLE: 2.35 CM (ref 2.1–4)
LEFT VENTRICLE DIASTOLIC VOLUME: 49.72 ML
LEFT VENTRICLE SYSTOLIC VOLUME: 19.15 ML
LEFT VENTRICULAR INTERNAL DIMENSION IN DIASTOLE: 3.47 CM (ref 3.5–6)
LEFT VENTRICULAR MASS: 72.99 G
LV LATERAL E/E' RATIO: 9.4 M/S
LV SEPTAL E/E' RATIO: 15.67 M/S
LVOT MG: 2.81 MMHG
LVOT MV: 0.8 CM/S
MV PEAK E VEL: 0.94 M/S
NUC STRESS DIASTOLIC VOLUME INDEX: 66
NUC STRESS EJECTION FRACTION: 68 %
NUC STRESS SYSTOLIC VOLUME INDEX: 21
OHS CV CPX 85 PERCENT MAX PREDICTED HEART RATE MALE: 118
OHS CV CPX MAX PREDICTED HEART RATE: 139
OHS CV CPX PATIENT IS FEMALE: 1
OHS CV CPX PATIENT IS MALE: 0
OHS CV CPX PEAK DIASTOLIC BLOOD PRESSURE: 80 MMHG
OHS CV CPX PEAK HEAR RATE: 101 BPM
OHS CV CPX PEAK RATE PRESSURE PRODUCT: NORMAL
OHS CV CPX PEAK SYSTOLIC BLOOD PRESSURE: 132 MMHG
OHS CV CPX PERCENT MAX PREDICTED HEART RATE ACHIEVED: 75
OHS CV CPX RATE PRESSURE PRODUCT PRESENTING: NORMAL
PISA TR MAX VEL: 2.36 M/S
PV PEAK GRADIENT: 6 MMHG
PV PEAK VELOCITY: 1.19 M/S
RA MAJOR: 4.14 CM
RA PRESSURE ESTIMATED: 8 MMHG
RA WIDTH: 3.26 CM
RIGHT VENTRICULAR END-DIASTOLIC DIMENSION: 3.32 CM
RV TB RVSP: 10 MMHG
SINUS: 3.02 CM
STJ: 2.65 CM
SYSTOLIC BLOOD PRESSURE: 129 MMHG
TDI LATERAL: 0.1 M/S
TDI SEPTAL: 0.06 M/S
TDI: 0.08 M/S
TR MAX PG: 22 MMHG
TRICUSPID ANNULAR PLANE SYSTOLIC EXCURSION: 2.34 CM
TV PEAK GRADIENT: 2 MMHG
TV REST PULMONARY ARTERY PRESSURE: 30 MMHG

## 2023-12-01 PROCEDURE — 93306 ECHO (CUPID ONLY): ICD-10-PCS | Mod: 26,HCNC,, | Performed by: INTERNAL MEDICINE

## 2023-12-01 PROCEDURE — 63600175 PHARM REV CODE 636 W HCPCS: Mod: HCNC | Performed by: INTERNAL MEDICINE

## 2023-12-01 PROCEDURE — 78452 NUCLEAR STRESS - CARDIOLOGY INTERPRETED (CUPID ONLY): ICD-10-PCS | Mod: 26,HCNC,, | Performed by: INTERNAL MEDICINE

## 2023-12-01 PROCEDURE — 93016 CV STRESS TEST SUPVJ ONLY: CPT | Mod: HCNC,,, | Performed by: INTERNAL MEDICINE

## 2023-12-01 PROCEDURE — 78452 HT MUSCLE IMAGE SPECT MULT: CPT | Mod: 26,HCNC,, | Performed by: INTERNAL MEDICINE

## 2023-12-01 PROCEDURE — 93017 CV STRESS TEST TRACING ONLY: CPT | Mod: HCNC

## 2023-12-01 PROCEDURE — 93018 NUCLEAR STRESS - CARDIOLOGY INTERPRETED (CUPID ONLY): ICD-10-PCS | Mod: HCNC,,, | Performed by: INTERNAL MEDICINE

## 2023-12-01 PROCEDURE — 93018 CV STRESS TEST I&R ONLY: CPT | Mod: HCNC,,, | Performed by: INTERNAL MEDICINE

## 2023-12-01 PROCEDURE — 93306 TTE W/DOPPLER COMPLETE: CPT | Mod: 26,HCNC,, | Performed by: INTERNAL MEDICINE

## 2023-12-01 PROCEDURE — 93016 NUCLEAR STRESS - CARDIOLOGY INTERPRETED (CUPID ONLY): ICD-10-PCS | Mod: HCNC,,, | Performed by: INTERNAL MEDICINE

## 2023-12-01 PROCEDURE — 93306 TTE W/DOPPLER COMPLETE: CPT | Mod: HCNC

## 2023-12-01 PROCEDURE — 78452 HT MUSCLE IMAGE SPECT MULT: CPT | Mod: HCNC

## 2023-12-01 RX ORDER — REGADENOSON 0.08 MG/ML
0.4 INJECTION, SOLUTION INTRAVENOUS ONCE
Status: COMPLETED | OUTPATIENT
Start: 2023-12-01 | End: 2023-12-01

## 2023-12-01 RX ADMIN — REGADENOSON 0.4 MG: 0.08 INJECTION, SOLUTION INTRAVENOUS at 08:12

## 2023-12-12 DIAGNOSIS — I35.8 AORTIC VALVE SCLEROSIS: ICD-10-CM

## 2023-12-12 DIAGNOSIS — I50.32 CHRONIC DIASTOLIC CONGESTIVE HEART FAILURE, NYHA CLASS 1: ICD-10-CM

## 2023-12-12 DIAGNOSIS — R07.9 CHEST PAIN, UNSPECIFIED TYPE: Primary | ICD-10-CM

## 2024-01-11 DIAGNOSIS — Z00.00 ENCOUNTER FOR MEDICARE ANNUAL WELLNESS EXAM: ICD-10-CM

## 2024-01-17 DIAGNOSIS — Z78.0 MENOPAUSE: ICD-10-CM

## 2024-01-20 DIAGNOSIS — F33.1 MODERATE EPISODE OF RECURRENT MAJOR DEPRESSIVE DISORDER: ICD-10-CM

## 2024-01-20 NOTE — TELEPHONE ENCOUNTER
No care due was identified.  Health [facility] Embedded Care Due Messages. Reference number: [id number].   1/20/2024 11:44:47 AM CST

## 2024-01-22 RX ORDER — DULOXETIN HYDROCHLORIDE 30 MG/1
30 CAPSULE, DELAYED RELEASE ORAL
Qty: 90 CAPSULE | Refills: 0 | Status: SHIPPED | OUTPATIENT
Start: 2024-01-22

## 2024-01-29 ENCOUNTER — OFFICE VISIT (OUTPATIENT)
Dept: FAMILY MEDICINE | Facility: CLINIC | Age: 84
End: 2024-01-29
Payer: MEDICARE

## 2024-01-29 VITALS
HEART RATE: 94 BPM | DIASTOLIC BLOOD PRESSURE: 72 MMHG | WEIGHT: 193.81 LBS | OXYGEN SATURATION: 96 % | TEMPERATURE: 98 F | BODY MASS INDEX: 35.66 KG/M2 | HEIGHT: 62 IN | SYSTOLIC BLOOD PRESSURE: 112 MMHG

## 2024-01-29 DIAGNOSIS — Z78.0 ASYMPTOMATIC POSTMENOPAUSAL STATE: ICD-10-CM

## 2024-01-29 DIAGNOSIS — R41.3 MEMORY LOSS: ICD-10-CM

## 2024-01-29 DIAGNOSIS — Z23 NEED FOR PNEUMOCOCCAL VACCINATION: ICD-10-CM

## 2024-01-29 DIAGNOSIS — H61.20 EXCESSIVE CERUMEN IN EAR CANAL, UNSPECIFIED LATERALITY: ICD-10-CM

## 2024-01-29 DIAGNOSIS — H91.90 HEARING LOSS, UNSPECIFIED HEARING LOSS TYPE, UNSPECIFIED LATERALITY: ICD-10-CM

## 2024-01-29 DIAGNOSIS — Z00.00 HEALTHCARE MAINTENANCE: ICD-10-CM

## 2024-01-29 DIAGNOSIS — Z12.31 ENCOUNTER FOR SCREENING MAMMOGRAM FOR MALIGNANT NEOPLASM OF BREAST: Primary | ICD-10-CM

## 2024-01-29 PROCEDURE — 1160F RVW MEDS BY RX/DR IN RCRD: CPT | Mod: HCNC,CPTII,S$GLB, | Performed by: INTERNAL MEDICINE

## 2024-01-29 PROCEDURE — 99214 OFFICE O/P EST MOD 30 MIN: CPT | Mod: HCNC,S$GLB,, | Performed by: INTERNAL MEDICINE

## 2024-01-29 PROCEDURE — 3288F FALL RISK ASSESSMENT DOCD: CPT | Mod: HCNC,CPTII,S$GLB, | Performed by: INTERNAL MEDICINE

## 2024-01-29 PROCEDURE — 1101F PT FALLS ASSESS-DOCD LE1/YR: CPT | Mod: HCNC,CPTII,S$GLB, | Performed by: INTERNAL MEDICINE

## 2024-01-29 PROCEDURE — G0009 ADMIN PNEUMOCOCCAL VACCINE: HCPCS | Mod: HCNC,S$GLB,, | Performed by: INTERNAL MEDICINE

## 2024-01-29 PROCEDURE — 99999 PR PBB SHADOW E&M-EST. PATIENT-LVL V: CPT | Mod: PBBFAC,HCNC,, | Performed by: INTERNAL MEDICINE

## 2024-01-29 PROCEDURE — 90677 PCV20 VACCINE IM: CPT | Mod: HCNC,S$GLB,, | Performed by: INTERNAL MEDICINE

## 2024-01-29 PROCEDURE — 1159F MED LIST DOCD IN RCRD: CPT | Mod: HCNC,CPTII,S$GLB, | Performed by: INTERNAL MEDICINE

## 2024-01-29 PROCEDURE — 3074F SYST BP LT 130 MM HG: CPT | Mod: HCNC,CPTII,S$GLB, | Performed by: INTERNAL MEDICINE

## 2024-01-29 PROCEDURE — 3078F DIAST BP <80 MM HG: CPT | Mod: HCNC,CPTII,S$GLB, | Performed by: INTERNAL MEDICINE

## 2024-01-29 NOTE — PROGRESS NOTES
HISTORY OF PRESENT ILLNESS:  Thad Maki is a 83 y.o. female who presents to the clinic today for Follow-up    Last seen by me 10/2023.  Son accompanies her today and on patient's requests assists in translation.    Nuclear Stress (12/2023)    Normal myocardial perfusion scan. There is no evidence of myocardial ischemia or infarction.    The gated perfusion images showed an ejection fraction of 68% post stress.    There is normal wall motion post stress.    Echo (12/2023)    Left Ventricle: The left ventricle is normal in size. Normal wall thickness. Normal wall motion. There is normal systolic function with a visually estimated ejection fraction of 60 - 65%. There is diastolic dysfunction but grade cannot be determined.    Right Ventricle: Normal right ventricular cavity size. Systolic function is normal.    Tricuspid Valve: There is mild regurgitation.    Pulmonary Artery: The estimated pulmonary artery systolic pressure is 30 mmHg.    She feels increased depressed mood.  Notes she is on Cymbalta, mirtazapine.    Reports she is taking multiple vitamins.    Son and patient note memory loss.  Notes some right hearing loss.  Notes some difficulty with sleep.    She requests mammogram.    PAST MEDICAL HISTORY:  Past Medical History:   Diagnosis Date    Anxiety     Depression     Gastritis     GERD (gastroesophageal reflux disease)     Hyperlipidemia     Hypothyroidism     Insomnia        PAST SURGICAL HISTORY:  Past Surgical History:   Procedure Laterality Date    CHOLECYSTECTOMY  2015/2016    KNEE ARTHROPLASTY      CAN'T REMEMBER WHAT SIDE OR WHEN       SOCIAL HISTORY:  Social History     Socioeconomic History    Marital status:    Tobacco Use    Smoking status: Never    Smokeless tobacco: Never   Substance and Sexual Activity    Alcohol use: Never    Drug use: Never    Sexual activity: Not Currently     Partners: Male       FAMILY HISTORY:  Family History   Problem Relation Age of Onset    Heart  disease Father        ALLERGIES AND MEDICATIONS: updated and reviewed.  Review of patient's allergies indicates:  No Known Allergies  Medication List with Changes/Refills   Current Medications    AMMONIUM LACTATE (LAC-HYDRIN) 12 % LOTION    Apply topically to itchy areas after bathing once daily    ATORVASTATIN (LIPITOR) 10 MG TABLET    Take 1 tablet by mouth once daily    CALCIUM CARBONATE-VITAMIN D3 600 MG-20 MCG (800 UNIT) TAB    Take 1 tablet by mouth once daily.    CETIRIZINE (ZYRTEC) 10 MG TABLET    Take 1 tablet (10 mg total) by mouth once daily.    DICLOFENAC SODIUM (VOLTAREN) 1 % GEL    Apply 2 grams to left shoulder QID-PRN for pain    DULOXETINE (CYMBALTA) 30 MG CAPSULE    Take 1 capsule by mouth once daily    FLUTICASONE PROPIONATE (FLONASE) 50 MCG/ACTUATION NASAL SPRAY    1 spray (50 mcg total) by Each Nostril route once daily.    LEVOTHYROXINE (EUTHYROX) 100 MCG TABLET    Take 1 tablet (100 mcg total) by mouth before breakfast.    MIRTAZAPINE (REMERON) 30 MG TABLET    Take 1 tablet (30 mg total) by mouth every evening.    NORTRIPTYLINE (PAMELOR) 50 MG CAPSULE    Take 1 capsule (50 mg total) by mouth every evening.    OMEPRAZOLE (PRILOSEC) 40 MG CAPSULE    Take 1 capsule (40 mg total) by mouth once daily.          CARE TEAM:  Patient Care Team:  Fadi Gresham Jr., MD as PCP - General (Family Medicine)         REVIEW OF SYSTEMS:  Review of Systems   Constitutional:  Negative for chills and fever.   HENT:  Positive for hearing loss. Negative for congestion and postnasal drip.    Eyes:  Negative for photophobia and visual disturbance.   Respiratory:  Negative for cough and shortness of breath.    Cardiovascular:  Negative for chest pain and palpitations.   Gastrointestinal:  Negative for nausea and vomiting.   Genitourinary:  Negative for dysuria and frequency.   Musculoskeletal:  Positive for arthralgias. Negative for back pain.   Neurological:  Negative for light-headedness and headaches.    Psychiatric/Behavioral:  Positive for dysphoric mood and sleep disturbance.          PHYSICAL EXAM:   Vitals:    01/29/24 1501   BP: 112/72   Pulse: 94   Temp: 98 °F (36.7 °C)             Body mass index is 35.44 kg/m².     General appearance - alert, well appearing, and in no distress and obese  Mental status - normal mood, behavior, speech, dress, motor activity, and thought processes  Eyes - sclera anicteric, left eye normal, right eye normal  Ears - ceruminosis noted  Chest - clear to auscultation, no wheezes, rales or rhonchi, symmetric air entry  Heart - normal rate, regular rhythm, normal S1, S2, no murmurs, rubs, clicks or gallops  Neurological - alert, oriented, normal speech, no focal findings or movement disorder noted  Extremities - peripheral pulses normal, no pedal edema, no clubbing or cyanosis      ASSESSMENT AND PLAN:  Encounter for screening mammogram for malignant neoplasm of breast  -     Mammo Digital Screening Bilat w/ [facility]; Future; Expected date: 01/29/2024    Asymptomatic postmenopausal state  -     DXA Bone Density Axial Skeleton 1 or more sites; Future; Expected date: 01/29/2024    Healthcare maintenance  -     DXA Bone Density Axial Skeleton 1 or more sites; Future; Expected date: 01/29/2024  -     Mammo Digital Screening Bilat w/ [facility]; Future; Expected date: 01/29/2024    Memory loss  -     Ambulatory referral/consult to Neurology; Future; Expected date: 02/05/2024    Hearing loss, unspecified hearing loss type, unspecified laterality  -     Ambulatory referral/consult to ENT; Future; Expected date: 02/05/2024  -     Ambulatory referral/consult to Audiology; Future; Expected date: 02/05/2024    Need for pneumococcal vaccination  -     (In Office Administered) Pneumococcal Conjugate Vaccine (20 Valent) (IM) (Preferred)    Excessive cerumen in ear canal, unspecified laterality  -     Ambulatory referral/consult to ENT; Future; Expected date: 02/05/2024  -     Ambulatory referral/consult  to Audiology; Future; Expected date: 02/05/2024              Follow up 6 months or sooner as needed.

## 2024-02-05 ENCOUNTER — OFFICE VISIT (OUTPATIENT)
Dept: CARDIOLOGY | Facility: CLINIC | Age: 84
End: 2024-02-05
Payer: MEDICARE

## 2024-02-05 VITALS
HEART RATE: 104 BPM | SYSTOLIC BLOOD PRESSURE: 140 MMHG | DIASTOLIC BLOOD PRESSURE: 70 MMHG | WEIGHT: 191.13 LBS | HEIGHT: 62 IN | OXYGEN SATURATION: 96 % | RESPIRATION RATE: 18 BRPM | BODY MASS INDEX: 35.17 KG/M2

## 2024-02-05 DIAGNOSIS — I35.8 AORTIC VALVE SCLEROSIS: ICD-10-CM

## 2024-02-05 DIAGNOSIS — R06.09 DOE (DYSPNEA ON EXERTION): ICD-10-CM

## 2024-02-05 DIAGNOSIS — I50.32 CHRONIC DIASTOLIC CONGESTIVE HEART FAILURE, NYHA CLASS 1: ICD-10-CM

## 2024-02-05 DIAGNOSIS — R07.9 CHEST PAIN, UNSPECIFIED TYPE: ICD-10-CM

## 2024-02-05 DIAGNOSIS — E66.01 SEVERE OBESITY (BMI 35.0-39.9) WITH COMORBIDITY: Primary | ICD-10-CM

## 2024-02-05 DIAGNOSIS — E78.2 MIXED HYPERLIPIDEMIA: ICD-10-CM

## 2024-02-05 DIAGNOSIS — I70.0 AORTIC ATHEROSCLEROSIS: ICD-10-CM

## 2024-02-05 DIAGNOSIS — R07.89 CHEST PAIN, ATYPICAL: ICD-10-CM

## 2024-02-05 DIAGNOSIS — R00.2 PALPITATIONS: ICD-10-CM

## 2024-02-05 LAB
OHS QRS DURATION: 86 MS
OHS QTC CALCULATION: 475 MS

## 2024-02-05 PROCEDURE — 1101F PT FALLS ASSESS-DOCD LE1/YR: CPT | Mod: HCNC,CPTII,S$GLB, | Performed by: INTERNAL MEDICINE

## 2024-02-05 PROCEDURE — 99999 PR PBB SHADOW E&M-EST. PATIENT-LVL IV: CPT | Mod: PBBFAC,HCNC,, | Performed by: INTERNAL MEDICINE

## 2024-02-05 PROCEDURE — 1159F MED LIST DOCD IN RCRD: CPT | Mod: HCNC,CPTII,S$GLB, | Performed by: INTERNAL MEDICINE

## 2024-02-05 PROCEDURE — 99204 OFFICE O/P NEW MOD 45 MIN: CPT | Mod: HCNC,S$GLB,, | Performed by: INTERNAL MEDICINE

## 2024-02-05 PROCEDURE — 1126F AMNT PAIN NOTED NONE PRSNT: CPT | Mod: HCNC,CPTII,S$GLB, | Performed by: INTERNAL MEDICINE

## 2024-02-05 PROCEDURE — 3077F SYST BP >= 140 MM HG: CPT | Mod: HCNC,CPTII,S$GLB, | Performed by: INTERNAL MEDICINE

## 2024-02-05 PROCEDURE — 3078F DIAST BP <80 MM HG: CPT | Mod: HCNC,CPTII,S$GLB, | Performed by: INTERNAL MEDICINE

## 2024-02-05 PROCEDURE — 3288F FALL RISK ASSESSMENT DOCD: CPT | Mod: HCNC,CPTII,S$GLB, | Performed by: INTERNAL MEDICINE

## 2024-02-05 PROCEDURE — 93000 ELECTROCARDIOGRAM COMPLETE: CPT | Mod: HCNC,S$GLB,, | Performed by: INTERNAL MEDICINE

## 2024-02-05 NOTE — PROGRESS NOTES
Subjective:   Patient ID:  Thad Maki is a 83 y.o. female who presents for evaluation of No chief complaint on file.      HPI    Diastolic CHF, HLD    Referred by Dr Masters  HISTORY OF PRESENT ILLNESS:  Thad Maki is a 83 y.o. female who presents to the clinic today for Chest Pain (X30 years) and Numbness (LESLIE hands and Rt leg and weakness x6 months)     Last seen by me 7/2023.  Accompanied by son.  Declined translation service and wishes for son to translate.     Attending PT for hip pain.     Numbness in both hands  H/o carpal tunnel surgery around 2000.  Into the palms and all fingers.     Chest pain  Feels in the right jaw and moves down to upper chest then down to abdominal pain.  Drinks some water and then helps it.  Sits for a little bit and this helps.  Not brought on by exertion.  Occurs once every 3-4 months.    Stress test 12/1/23    Normal myocardial perfusion scan. There is no evidence of myocardial ischemia or infarction.    The gated perfusion images showed an ejection fraction of 68% post stress.    There is normal wall motion post stress.     Echo 12/1/23    Left Ventricle: The left ventricle is normal in size. Normal wall thickness. Normal wall motion. There is normal systolic function with a visually estimated ejection fraction of 60 - 65%. There is diastolic dysfunction but grade cannot be determined.    Right Ventricle: Normal right ventricular cavity size. Systolic function is normal.    Tricuspid Valve: There is mild regurgitation.    Pulmonary Artery: The estimated pulmonary artery systolic pressure is 30 mmHg.    2/5/24 Occasional sharp CP, also gets palpitations every 1-2 weeks, mild CORDOVA  BP controlled  EKG NSR - ok    Review of Systems   Constitutional: Negative for decreased appetite.   HENT:  Negative for ear discharge.    Eyes:  Negative for blurred vision.   Respiratory:  Negative for hemoptysis.    Endocrine: Negative for polyphagia.   Hematologic/Lymphatic: Negative  for adenopathy.   Skin:  Negative for color change.   Musculoskeletal:  Negative for joint swelling.   Genitourinary:  Negative for bladder incontinence.   Neurological:  Negative for brief paralysis.   Psychiatric/Behavioral:  Negative for hallucinations.    Allergic/Immunologic: Negative for hives.       Objective:   Physical Exam  Constitutional:       Appearance: She is well-developed.   HENT:      Head: Normocephalic and atraumatic.   Eyes:      Conjunctiva/sclera: Conjunctivae normal.      Pupils: Pupils are equal, round, and reactive to light.   Cardiovascular:      Rate and Rhythm: Normal rate.      Pulses: Intact distal pulses.      Heart sounds: Normal heart sounds.   Pulmonary:      Effort: Pulmonary effort is normal.      Breath sounds: Normal breath sounds.   Abdominal:      General: Bowel sounds are normal.      Palpations: Abdomen is soft.   Musculoskeletal:         General: Normal range of motion.      Cervical back: Normal range of motion and neck supple.   Skin:     General: Skin is warm and dry.   Neurological:      Mental Status: She is alert and oriented to person, place, and time.         Assessment:      1. Severe obesity (BMI 35.0-39.9) with comorbidity    2. Chest pain, unspecified type    3. Aortic valve sclerosis    4. Chronic diastolic congestive heart failure, NYHA class 1    5. Aortic atherosclerosis    6. Mixed hyperlipidemia    7. CORDOVA (dyspnea on exertion)    8. Chest pain, atypical    9. Palpitations        Plan:     CP atypical - recent echo, stress test, EKG ok  Event monitor for palpitations  Continue Rx for HLD, aortic atherosclerosis

## 2024-02-14 ENCOUNTER — CLINICAL SUPPORT (OUTPATIENT)
Dept: AUDIOLOGY | Facility: CLINIC | Age: 84
End: 2024-02-14
Payer: MEDICARE

## 2024-02-14 ENCOUNTER — OFFICE VISIT (OUTPATIENT)
Dept: OTOLARYNGOLOGY | Facility: CLINIC | Age: 84
End: 2024-02-14
Payer: MEDICARE

## 2024-02-14 VITALS — HEIGHT: 62 IN | BODY MASS INDEX: 35.15 KG/M2 | WEIGHT: 191 LBS

## 2024-02-14 DIAGNOSIS — H90.3 SENSORINEURAL HEARING LOSS (SNHL), BILATERAL: ICD-10-CM

## 2024-02-14 DIAGNOSIS — H61.20 EXCESSIVE CERUMEN IN EAR CANAL, UNSPECIFIED LATERALITY: ICD-10-CM

## 2024-02-14 DIAGNOSIS — H61.23 BILATERAL IMPACTED CERUMEN: Primary | ICD-10-CM

## 2024-02-14 DIAGNOSIS — H91.90 HEARING LOSS, UNSPECIFIED HEARING LOSS TYPE, UNSPECIFIED LATERALITY: ICD-10-CM

## 2024-02-14 DIAGNOSIS — H90.3 SENSORINEURAL HEARING LOSS (SNHL) OF BOTH EARS: Primary | ICD-10-CM

## 2024-02-14 PROCEDURE — 99213 OFFICE O/P EST LOW 20 MIN: CPT | Mod: 25,S$GLB,, | Performed by: NURSE PRACTITIONER

## 2024-02-14 PROCEDURE — 1101F PT FALLS ASSESS-DOCD LE1/YR: CPT | Mod: CPTII,S$GLB,, | Performed by: NURSE PRACTITIONER

## 2024-02-14 PROCEDURE — 69210 REMOVE IMPACTED EAR WAX UNI: CPT | Mod: S$GLB,,, | Performed by: NURSE PRACTITIONER

## 2024-02-14 PROCEDURE — 3288F FALL RISK ASSESSMENT DOCD: CPT | Mod: CPTII,S$GLB,, | Performed by: NURSE PRACTITIONER

## 2024-02-14 PROCEDURE — 92557 COMPREHENSIVE HEARING TEST: CPT | Mod: S$GLB,,,

## 2024-02-14 PROCEDURE — 1126F AMNT PAIN NOTED NONE PRSNT: CPT | Mod: CPTII,S$GLB,, | Performed by: NURSE PRACTITIONER

## 2024-02-14 PROCEDURE — 92567 TYMPANOMETRY: CPT | Mod: S$GLB,,,

## 2024-02-14 PROCEDURE — 1159F MED LIST DOCD IN RCRD: CPT | Mod: CPTII,S$GLB,, | Performed by: NURSE PRACTITIONER

## 2024-02-14 NOTE — PROGRESS NOTES
Please click on link to view Audiogram:  Document on 2/14/2024 10:56 AM by Cristal Sandoval AU.D: Audiogram    Ms. Thad Maki, a 83 y.o. female, was seen in the clinic today for an audiological evaluation.     Tympanometry testing revealed a Type As tympanogram for the right ear and a Type As tympanogram for the left ear.     Audiological testing revealed a mild sloping to severe sensorineural hearing loss (SNHL) bilaterally. Could not test for speech reception thresholds and speech discrimination  due to language barrier.    Recommendations:  1. Otologic evaluation  2. Annual audiological evaluation or sooner if hearing changes  3. Hearing protection when in noise   4. Hearing aid consultation following medical clearance

## 2024-02-14 NOTE — PROGRESS NOTES
OTOLARYNGOLOGY CLINIC NOTE  Date:  02/14/2024     Chief complaint:  Chief Complaint   Patient presents with    Cerumen Impaction    Hearing Loss     History of Present Illness  Thad Maki is a 83 y.o. female  presenting today for a new evaluation and treatment of cerumen impaction and hearing loss.  Pt reports having decreased hearing in her ears that comes and goes depending on how she moves her head and if she is laying on that side of her head.  Pt reports her  keeps the TV very loud all the time at home.  Pt denies any pain, pressure or fullness to her ears.      Past Medical History  Past Medical History:   Diagnosis Date    Anxiety     Depression     Gastritis     GERD (gastroesophageal reflux disease)     Hyperlipidemia     Hypothyroidism     Insomnia       Past Surgical History  Past Surgical History:   Procedure Laterality Date    CHOLECYSTECTOMY  2015/2016    KNEE ARTHROPLASTY      CAN'T REMEMBER WHAT SIDE OR WHEN      Medications  Current Outpatient Medications on File Prior to Visit   Medication Sig Dispense Refill    ammonium lactate (LAC-HYDRIN) 12 % lotion Apply topically to itchy areas after bathing once daily 396 g 11    atorvastatin (LIPITOR) 10 MG tablet Take 1 tablet by mouth once daily 90 tablet 3    calcium carbonate-vitamin D3 600 mg-20 mcg (800 unit) Tab Take 1 tablet by mouth once daily.      diclofenac sodium (VOLTAREN) 1 % Gel Apply 2 grams to left shoulder QID-PRN for pain 100 g 11    DULoxetine (CYMBALTA) 30 MG capsule Take 1 capsule by mouth once daily 90 capsule 0    fluticasone propionate (FLONASE) 50 mcg/actuation nasal spray 1 spray (50 mcg total) by Each Nostril route once daily. 9.9 mL 2    levothyroxine (EUTHYROX) 100 MCG tablet Take 1 tablet (100 mcg total) by mouth before breakfast. 90 tablet 3    mirtazapine (REMERON) 30 MG tablet Take 1 tablet (30 mg total) by mouth every evening. 90 tablet 2    nortriptyline (PAMELOR) 50 MG capsule Take 1 capsule (50 mg total)  "by mouth every evening. 90 capsule 3    cetirizine (ZYRTEC) 10 MG tablet Take 1 tablet (10 mg total) by mouth once daily. 90 tablet 0    omeprazole (PRILOSEC) 40 MG capsule Take 1 capsule (40 mg total) by mouth once daily. 30 capsule 2     No current facility-administered medications on file prior to visit.     Review of Systems  Review of Systems   Constitutional: Negative.    HENT:  Positive for ear pain and hearing loss.    Eyes: Negative.    Respiratory: Negative.     Cardiovascular:  Positive for chest pain.   Gastrointestinal:  Positive for heartburn.   Musculoskeletal:  Positive for back pain and neck pain.   Skin: Negative.    Neurological:  Positive for dizziness and headaches.   Psychiatric/Behavioral:  Positive for depression. The patient is nervous/anxious.       Social History   reports that she has never smoked. She has never used smokeless tobacco. She reports that she does not drink alcohol and does not use drugs.     Family History  Family History   Problem Relation Age of Onset    Heart disease Father       Physical Exam   There were no vitals filed for this visit. Body mass index is 34.93 kg/m².  Weight: 86.6 kg (191 lb)   Height: 5' 2" (157.5 cm)     GENERAL: no acute distress.  HEAD: normocephalic.   EYES: lids and lashes normal. No scleral icterus  EARS: external ear without lesion, normal pinna shape and position.  External auditory canal with cerumen impaction bilaterally.   NOSE: external nose without significant bony abnormality  ORAL CAVITY/OROPHARYNX: tongue midline and mobile. Symmetric palate rise. Uvula midline.   NECK: trachea midline.   LYMPH NODES:No cervical lymphadenopathy.  RESPIRATORY: no stridor, no stertor. Voice normal. Respirations nonlabored.  NEURO: alert, responds to questions appropriately.   Cranial nerve exam as indicated in above sections and additionally showed facial movement symmetric with good eye closure and symmetric smile.   PSYCH:mood appropriate    Procedure " Note   Procedure performed:microscopic examination of ears with cerumen disimpaction    Indication for procedure: bilateral cerumen impaction     Description of procedure:  After verbal consent was obtained, the patient was positioned in semi recumbent position and speculum was placed in the right ear and microscope brought into the field.  The microscope was positioned and magnification adjusted for appropriate visualization. Otologic instruments including various size otologic suctions and curette were used to remove the cerumen from the right external auditory canals under visualization with the operating microscope. After cleaning, visualization was again performed and at various levels of higher magnification to optimize views of the ear canal, tympanic membrane, ossicles and middle ear space. The same procedure was then repeated for the left ear. Findings as indicated below. All portions of the procedure and examination by otomicroscopy were tolerated well without complication.     Findings:  Right ear: Complete cerumen impaction removed entirely revealing normal external auditory canal; tympanic membrane without bulging, retraction, or perforation; no evidence of middle ear fluid or effusion.   Left ear: Complete cerumen impaction removed entirely revealing normal external auditory canal; tympanic membrane without bulging, retraction, or perforation; no evidence of middle ear fluid or effusion.     Imaging:  The patient does not have any pertinent and/or recent imaging of the head and neck.     Labs:  CBC  Recent Labs   Lab 12/07/21  1445 09/12/22  0743 07/10/23  1043   WBC 8.15 7.05 7.85   Hemoglobin 13.3 13.0 13.0   Hematocrit 42.2 40.4 42.1   MCV 86 85 88   Platelets 209 223 237     BMP  Recent Labs   Lab 12/07/21  1445 09/12/22  0743 07/10/23  1043   Glucose 96 91 92   Sodium 143 144 143   Potassium 4.6 4.3 5.1   Chloride 105 106 108   CO2 28 29 27   BUN 18 15 20   Creatinine 0.8 0.7 0.8   Calcium 9.7 9.8  9.7     Assessment  1. Sensorineural hearing loss (SNHL), bilateral    2. Bilateral impacted cerumen    3. Hearing loss, unspecified hearing loss type, unspecified laterality  - Ambulatory referral/consult to ENT    4. Excessive cerumen in ear canal, unspecified laterality  - Ambulatory referral/consult to ENT     Plan:  SNHL:  Audiogram reviewed and discussed with patient. Recheck hearing in 1 year or sooner if subjective change noted. Encouraged hearing protection while in noisy environments. Hearing aid consultation recommended. Discussed plan of care with patient in detail and all questions answered. Patient reported understanding of plan of care.

## 2024-02-15 ENCOUNTER — CLINICAL SUPPORT (OUTPATIENT)
Dept: CARDIOLOGY | Facility: HOSPITAL | Age: 84
End: 2024-02-15
Attending: INTERNAL MEDICINE
Payer: MEDICARE

## 2024-02-15 DIAGNOSIS — R07.9 CHEST PAIN, UNSPECIFIED TYPE: ICD-10-CM

## 2024-02-15 DIAGNOSIS — I50.32 CHRONIC DIASTOLIC CONGESTIVE HEART FAILURE, NYHA CLASS 1: ICD-10-CM

## 2024-02-15 DIAGNOSIS — E66.01 SEVERE OBESITY (BMI 35.0-39.9) WITH COMORBIDITY: ICD-10-CM

## 2024-02-15 DIAGNOSIS — E78.2 MIXED HYPERLIPIDEMIA: ICD-10-CM

## 2024-02-15 DIAGNOSIS — I35.8 AORTIC VALVE SCLEROSIS: ICD-10-CM

## 2024-02-15 DIAGNOSIS — R07.89 CHEST PAIN, ATYPICAL: ICD-10-CM

## 2024-02-15 DIAGNOSIS — R06.09 DOE (DYSPNEA ON EXERTION): ICD-10-CM

## 2024-02-15 DIAGNOSIS — R00.2 PALPITATIONS: ICD-10-CM

## 2024-02-15 DIAGNOSIS — I70.0 AORTIC ATHEROSCLEROSIS: ICD-10-CM

## 2024-02-15 PROCEDURE — 93272 ECG/REVIEW INTERPRET ONLY: CPT | Mod: HCNC,,, | Performed by: INTERNAL MEDICINE

## 2024-02-15 PROCEDURE — 93271 ECG/MONITORING AND ANALYSIS: CPT | Mod: HCNC

## 2024-02-20 ENCOUNTER — HOSPITAL ENCOUNTER (OUTPATIENT)
Dept: RADIOLOGY | Facility: HOSPITAL | Age: 84
Discharge: HOME | End: 2024-02-20
Attending: INTERNAL MEDICINE
Payer: MEDICARE

## 2024-02-20 DIAGNOSIS — Z00.00 HEALTHCARE MAINTENANCE: ICD-10-CM

## 2024-02-20 DIAGNOSIS — Z12.31 ENCOUNTER FOR SCREENING MAMMOGRAM FOR MALIGNANT NEOPLASM OF BREAST: ICD-10-CM

## 2024-02-20 PROCEDURE — 77067 SCR MAMMO BI INCL CAD: CPT | Mod: TC,HCNC

## 2024-02-20 PROCEDURE — 77063 BREAST TOMOSYNTHESIS BI: CPT | Mod: 26,HCNC,, | Performed by: RADIOLOGY

## 2024-02-20 PROCEDURE — 77067 SCR MAMMO BI INCL CAD: CPT | Mod: 26,HCNC,, | Performed by: RADIOLOGY

## 2024-03-20 ENCOUNTER — PATIENT MESSAGE (OUTPATIENT)
Dept: PSYCHIATRY | Facility: CLINIC | Age: 84
End: 2024-03-20
Payer: MEDICARE

## 2024-04-23 ENCOUNTER — OFFICE VISIT (OUTPATIENT)
Dept: CARDIOLOGY | Facility: CLINIC | Age: 84
End: 2024-04-23
Payer: MEDICARE

## 2024-04-23 VITALS
BODY MASS INDEX: 35.81 KG/M2 | OXYGEN SATURATION: 95 % | HEART RATE: 92 BPM | WEIGHT: 189.69 LBS | HEIGHT: 61 IN | SYSTOLIC BLOOD PRESSURE: 110 MMHG | DIASTOLIC BLOOD PRESSURE: 56 MMHG

## 2024-04-23 DIAGNOSIS — E78.2 MIXED HYPERLIPIDEMIA: ICD-10-CM

## 2024-04-23 DIAGNOSIS — I70.0 AORTIC ATHEROSCLEROSIS: ICD-10-CM

## 2024-04-23 DIAGNOSIS — R00.2 PALPITATIONS: Primary | ICD-10-CM

## 2024-04-23 DIAGNOSIS — R06.09 DOE (DYSPNEA ON EXERTION): ICD-10-CM

## 2024-04-23 DIAGNOSIS — R07.89 CHEST PAIN, ATYPICAL: ICD-10-CM

## 2024-04-23 PROCEDURE — 1159F MED LIST DOCD IN RCRD: CPT | Mod: CPTII,S$GLB,, | Performed by: INTERNAL MEDICINE

## 2024-04-23 PROCEDURE — 1124F ACP DISCUSS-NO DSCNMKR DOCD: CPT | Mod: CPTII,S$GLB,, | Performed by: INTERNAL MEDICINE

## 2024-04-23 PROCEDURE — 3288F FALL RISK ASSESSMENT DOCD: CPT | Mod: CPTII,S$GLB,, | Performed by: INTERNAL MEDICINE

## 2024-04-23 PROCEDURE — 99999 PR PBB SHADOW E&M-EST. PATIENT-LVL III: CPT | Mod: PBBFAC,,, | Performed by: INTERNAL MEDICINE

## 2024-04-23 PROCEDURE — 1101F PT FALLS ASSESS-DOCD LE1/YR: CPT | Mod: CPTII,S$GLB,, | Performed by: INTERNAL MEDICINE

## 2024-04-23 PROCEDURE — 1126F AMNT PAIN NOTED NONE PRSNT: CPT | Mod: CPTII,S$GLB,, | Performed by: INTERNAL MEDICINE

## 2024-04-23 PROCEDURE — 3078F DIAST BP <80 MM HG: CPT | Mod: CPTII,S$GLB,, | Performed by: INTERNAL MEDICINE

## 2024-04-23 PROCEDURE — 99214 OFFICE O/P EST MOD 30 MIN: CPT | Mod: S$GLB,,, | Performed by: INTERNAL MEDICINE

## 2024-04-23 PROCEDURE — 3074F SYST BP LT 130 MM HG: CPT | Mod: CPTII,S$GLB,, | Performed by: INTERNAL MEDICINE

## 2024-04-23 RX ORDER — BUSPIRONE HYDROCHLORIDE 5 MG/1
5 TABLET ORAL 2 TIMES DAILY
COMMUNITY
Start: 2024-04-22

## 2024-04-23 RX ORDER — DONEPEZIL HYDROCHLORIDE 10 MG/1
1 TABLET, FILM COATED ORAL DAILY
COMMUNITY

## 2024-04-23 RX ORDER — AZITHROMYCIN 250 MG/1
250 TABLET, FILM COATED ORAL ONCE
COMMUNITY

## 2024-04-23 NOTE — PROGRESS NOTES
Subjective   Patient ID:  Tahd Maki is a 83 y.o. female who presents for follow-up of Follow-up      HPI      Diastolic CHF, HLD     Referred by Dr Masters  HISTORY OF PRESENT ILLNESS:  Thad Maki is a 83 y.o. female who presents to the clinic today for Chest Pain (X30 years) and Numbness (LESLIE hands and Rt leg and weakness x6 months)     Last seen by me 7/2023.  Accompanied by son.  Declined translation service and wishes for son to translate.     Attending PT for hip pain.     Numbness in both hands  H/o carpal tunnel surgery around 2000.  Into the palms and all fingers.     Chest pain  Feels in the right jaw and moves down to upper chest then down to abdominal pain.  Drinks some water and then helps it.  Sits for a little bit and this helps.  Not brought on by exertion.  Occurs once every 3-4 months.     Stress test 12/1/23    Normal myocardial perfusion scan. There is no evidence of myocardial ischemia or infarction.    The gated perfusion images showed an ejection fraction of 68% post stress.    There is normal wall motion post stress.     Echo 12/1/23    Left Ventricle: The left ventricle is normal in size. Normal wall thickness. Normal wall motion. There is normal systolic function with a visually estimated ejection fraction of 60 - 65%. There is diastolic dysfunction but grade cannot be determined.    Right Ventricle: Normal right ventricular cavity size. Systolic function is normal.    Tricuspid Valve: There is mild regurgitation.    Pulmonary Artery: The estimated pulmonary artery systolic pressure is 30 mmHg.    Event monitor 2/15/24    Negative event monitor with no clinical arrhythmias.    Symptoms corresponding with normal sinus rhythm.        2/5/24 Occasional sharp CP, also gets palpitations every 1-2 weeks, mild CORDOVA  BP controlled  EKG NSR - ok  CP atypical - recent echo, stress test, EKG ok  Event monitor for palpitations  Continue Rx for HLD, aortic atherosclerosis    4/23/24 Family  translated. Denies CP, SOB, or further palpitations  BP controlled    Review of Systems   Constitutional: Negative for decreased appetite.   HENT:  Negative for ear discharge.    Eyes:  Negative for blurred vision.   Respiratory:  Negative for hemoptysis.    Endocrine: Negative for polyphagia.   Hematologic/Lymphatic: Negative for adenopathy.   Skin:  Negative for color change.   Musculoskeletal:  Negative for joint swelling.   Genitourinary:  Negative for bladder incontinence.   Neurological:  Negative for brief paralysis.   Psychiatric/Behavioral:  Negative for hallucinations.    Allergic/Immunologic: Negative for hives.          Objective     Physical Exam  Constitutional:       Appearance: She is well-developed.   HENT:      Head: Normocephalic and atraumatic.   Eyes:      Conjunctiva/sclera: Conjunctivae normal.      Pupils: Pupils are equal, round, and reactive to light.   Cardiovascular:      Rate and Rhythm: Normal rate.      Pulses: Intact distal pulses.      Heart sounds: Normal heart sounds.   Pulmonary:      Effort: Pulmonary effort is normal.      Breath sounds: Normal breath sounds.   Abdominal:      General: Bowel sounds are normal.      Palpations: Abdomen is soft.   Musculoskeletal:         General: Normal range of motion.      Cervical back: Normal range of motion and neck supple.   Skin:     General: Skin is warm and dry.   Neurological:      Mental Status: She is alert and oriented to person, place, and time.            Assessment and Plan     1. Palpitations    2. CORDOVA (dyspnea on exertion)    3. Aortic atherosclerosis    4. Mixed hyperlipidemia    5. Chest pain, atypical        Plan:    Event monitor ok. Symptoms improved   Continue Rx for HLD, aortic atherosclerosis  OV 6 months    Advance Care Planning     Date: 04/23/2024  Patient did not wish or was not able to name a surrogate decision maker or provide an Advance Care Plan.

## 2024-06-13 ENCOUNTER — OFFICE VISIT (OUTPATIENT)
Dept: OTOLARYNGOLOGY | Facility: CLINIC | Age: 84
End: 2024-06-13
Payer: MEDICARE

## 2024-06-13 VITALS
HEART RATE: 105 BPM | BODY MASS INDEX: 35.8 KG/M2 | WEIGHT: 189.63 LBS | HEIGHT: 61 IN | SYSTOLIC BLOOD PRESSURE: 133 MMHG | DIASTOLIC BLOOD PRESSURE: 80 MMHG

## 2024-06-13 DIAGNOSIS — R42 DIZZINESS: Primary | ICD-10-CM

## 2024-06-13 PROCEDURE — 3079F DIAST BP 80-89 MM HG: CPT | Mod: CPTII,S$GLB,, | Performed by: NURSE PRACTITIONER

## 2024-06-13 PROCEDURE — 3288F FALL RISK ASSESSMENT DOCD: CPT | Mod: CPTII,S$GLB,, | Performed by: NURSE PRACTITIONER

## 2024-06-13 PROCEDURE — 1126F AMNT PAIN NOTED NONE PRSNT: CPT | Mod: CPTII,S$GLB,, | Performed by: NURSE PRACTITIONER

## 2024-06-13 PROCEDURE — 99213 OFFICE O/P EST LOW 20 MIN: CPT | Mod: S$GLB,,, | Performed by: NURSE PRACTITIONER

## 2024-06-13 PROCEDURE — 1101F PT FALLS ASSESS-DOCD LE1/YR: CPT | Mod: CPTII,S$GLB,, | Performed by: NURSE PRACTITIONER

## 2024-06-13 PROCEDURE — 3075F SYST BP GE 130 - 139MM HG: CPT | Mod: CPTII,S$GLB,, | Performed by: NURSE PRACTITIONER

## 2024-06-13 PROCEDURE — 1159F MED LIST DOCD IN RCRD: CPT | Mod: CPTII,S$GLB,, | Performed by: NURSE PRACTITIONER

## 2024-06-14 NOTE — PROGRESS NOTES
OTOLARYNGOLOGY CLINIC NOTE  Date:  06/14/2024     Chief complaint:  Chief Complaint   Patient presents with    Follow-up     Dizziness      History of Present Illness  Thad Maki is a 83 y.o. female  presenting today for a dizziness evaluation.  Pt son reports pt had 3 episodes of dizziness.  Pt son reports when she is multitasking she becomes confused and shakes her head.  Pt son reports she feels confused and disoriented.  Pt son reports he has witnessed these episodes.  Pt denies any dizziness with head turns, looking up or down, or when turning while lying down.  Pt denies any trauma prior to episodes.  Pt denies any recent occurrence.      Review of medical records and prior documentation  Past medical records were reviewed with data pertinent to the chief complaint summarized in the HPI. Information obtained from review of medical records is attributed to respective sources in the HPI with reference to sources of information at their mention. Records reviewed included all recent notes from referring provider, primary care, and related subspecialty evaluations as available. This review of records was performed and additional data obtained to supplement history obtained from the patient and further inform medical decision making involved in formulating a plan of care accounting for all history and treatment relevant to the issues addressed.    Past Medical History  Past Medical History:   Diagnosis Date    Anxiety     Depression     Gastritis     GERD (gastroesophageal reflux disease)     Hyperlipidemia     Hypothyroidism     Insomnia       Past Surgical History  Past Surgical History:   Procedure Laterality Date    CHOLECYSTECTOMY  2015/2016    KNEE ARTHROPLASTY      CAN'T REMEMBER WHAT SIDE OR WHEN      Medications  Current Outpatient Medications on File Prior to Visit   Medication Sig Dispense Refill    ammonium lactate (LAC-HYDRIN) 12 % lotion Apply topically to itchy areas after bathing once daily  396 g 11    atorvastatin (LIPITOR) 10 MG tablet Take 1 tablet by mouth once daily 90 tablet 3    azithromycin (Z-ALLY) 250 MG tablet Take 250 mg by mouth once.      busPIRone (BUSPAR) 5 MG Tab Take 5 mg by mouth 2 (two) times daily.      calcium carbonate-vitamin D3 600 mg-20 mcg (800 unit) Tab Take 1 tablet by mouth once daily.      diclofenac sodium (VOLTAREN) 1 % Gel Apply 2 grams to left shoulder QID-PRN for pain 100 g 11    donepeziL (ARICEPT) 10 MG tablet Take 1 tablet by mouth once daily.      DULoxetine (CYMBALTA) 30 MG capsule Take 1 capsule by mouth once daily 90 capsule 0    fluticasone propionate (FLONASE) 50 mcg/actuation nasal spray 1 spray (50 mcg total) by Each Nostril route once daily. 9.9 mL 2    levothyroxine (EUTHYROX) 100 MCG tablet Take 1 tablet (100 mcg total) by mouth before breakfast. 90 tablet 3    nortriptyline (PAMELOR) 50 MG capsule Take 1 capsule (50 mg total) by mouth every evening. 90 capsule 3    cetirizine (ZYRTEC) 10 MG tablet Take 1 tablet (10 mg total) by mouth once daily. 90 tablet 0    mirtazapine (REMERON) 30 MG tablet Take 1 tablet (30 mg total) by mouth every evening. 90 tablet 2    omeprazole (PRILOSEC) 40 MG capsule Take 1 capsule (40 mg total) by mouth once daily. 30 capsule 2     No current facility-administered medications on file prior to visit.     Review of Systems  Review of Systems   Constitutional: Negative.    HENT:  Positive for hearing loss.    Eyes: Negative.    Respiratory: Negative.     Cardiovascular: Negative.    Neurological:  Positive for dizziness.      Social History   reports that she has never smoked. She has never used smokeless tobacco. She reports that she does not drink alcohol and does not use drugs.     Family History  Family History   Problem Relation Name Age of Onset    Heart disease Father        Physical Exam   Vitals:    06/13/24 0907   BP: 133/80   Pulse: 105    Body mass index is 35.82 kg/m².  Weight: 86 kg (189 lb 9.5 oz)   Height: 5'  "1" (154.9 cm)     GENERAL: no acute distress.  HEAD: normocephalic.   EYES: lids and lashes normal. No scleral icterus  EARS: external ear without lesion, normal pinna shape and position.  External auditory canal with normal cerumen, tympanic membrane fully visible, no perforation , no retraction. No middle ear effusion. Ossicles intact.   NOSE: external nose without significant bony abnormality  ORAL CAVITY/OROPHARYNX: tongue midline and mobile. Symmetric palate rise. Uvula midline.   NECK: trachea midline.   LYMPH NODES:No cervical lymphadenopathy.  RESPIRATORY: no stridor, no stertor. Voice normal. Respirations nonlabored.  NEURO: alert, responds to questions appropriately.   Cranial nerve exam as indicated in above sections and additionally showed facial movement symmetric with good eye closure and symmetric smile.   PSYCH:mood appropriate    Imaging:  The patient does not have any pertinent and/or recent imaging of the head and neck.     Labs:  CBC  Recent Labs   Lab 12/07/21  1445 09/12/22  0743 07/10/23  1043   WBC 8.15 7.05 7.85   Hemoglobin 13.3 13.0 13.0   Hematocrit 42.2 40.4 42.1   MCV 86 85 88   Platelets 209 223 237     BMP  Recent Labs   Lab 12/07/21  1445 09/12/22  0743 07/10/23  1043   Glucose 96 91 92   Sodium 143 144 143   Potassium 4.6 4.3 5.1   Chloride 105 106 108   CO2 28 29 27   BUN 18 15 20   Creatinine 0.8 0.7 0.8   Calcium 9.7 9.8 9.7     Assessment  1. Dizziness     Plan:  Dizziness: Exam shows a normal functioning vestibular system and no evidence of BPPV.   We discussed that this is common may happen when the vestibular ocular reflex malfunctions and occurs more frequently with age.  Pt advised of therapy options if this occurs.  Pt doesn't want any vestibular therapy at this time.  F/u prn.  Discussed plan of care with patient in detail and all questions answered. Patient reported understanding of plan of care.   "

## 2024-11-05 ENCOUNTER — OFFICE VISIT (OUTPATIENT)
Dept: CARDIOLOGY | Facility: CLINIC | Age: 84
End: 2024-11-05
Payer: MEDICARE

## 2024-11-05 VITALS
HEART RATE: 91 BPM | WEIGHT: 186.06 LBS | HEIGHT: 62 IN | BODY MASS INDEX: 34.24 KG/M2 | SYSTOLIC BLOOD PRESSURE: 104 MMHG | DIASTOLIC BLOOD PRESSURE: 78 MMHG

## 2024-11-05 DIAGNOSIS — R06.09 DOE (DYSPNEA ON EXERTION): ICD-10-CM

## 2024-11-05 DIAGNOSIS — R00.2 PALPITATIONS: Primary | ICD-10-CM

## 2024-11-05 DIAGNOSIS — E78.2 MIXED HYPERLIPIDEMIA: ICD-10-CM

## 2024-11-05 DIAGNOSIS — H53.9 VISUAL CHANGES: ICD-10-CM

## 2024-11-05 DIAGNOSIS — R07.89 CHEST PAIN, ATYPICAL: ICD-10-CM

## 2024-11-05 DIAGNOSIS — I70.0 AORTIC ATHEROSCLEROSIS: ICD-10-CM

## 2024-11-05 LAB
OHS QRS DURATION: 72 MS
OHS QTC CALCULATION: 460 MS

## 2024-11-05 PROCEDURE — 3078F DIAST BP <80 MM HG: CPT | Mod: HCNC,CPTII,S$GLB, | Performed by: INTERNAL MEDICINE

## 2024-11-05 PROCEDURE — 3288F FALL RISK ASSESSMENT DOCD: CPT | Mod: HCNC,CPTII,S$GLB, | Performed by: INTERNAL MEDICINE

## 2024-11-05 PROCEDURE — 1159F MED LIST DOCD IN RCRD: CPT | Mod: HCNC,CPTII,S$GLB, | Performed by: INTERNAL MEDICINE

## 2024-11-05 PROCEDURE — 99999 PR PBB SHADOW E&M-EST. PATIENT-LVL IV: CPT | Mod: PBBFAC,HCNC,, | Performed by: INTERNAL MEDICINE

## 2024-11-05 PROCEDURE — 1101F PT FALLS ASSESS-DOCD LE1/YR: CPT | Mod: HCNC,CPTII,S$GLB, | Performed by: INTERNAL MEDICINE

## 2024-11-05 PROCEDURE — 93000 ELECTROCARDIOGRAM COMPLETE: CPT | Mod: HCNC,S$GLB,, | Performed by: INTERNAL MEDICINE

## 2024-11-05 PROCEDURE — 99214 OFFICE O/P EST MOD 30 MIN: CPT | Mod: HCNC,S$GLB,, | Performed by: INTERNAL MEDICINE

## 2024-11-05 PROCEDURE — 1124F ACP DISCUSS-NO DSCNMKR DOCD: CPT | Mod: HCNC,CPTII,S$GLB, | Performed by: INTERNAL MEDICINE

## 2024-11-05 PROCEDURE — 3074F SYST BP LT 130 MM HG: CPT | Mod: HCNC,CPTII,S$GLB, | Performed by: INTERNAL MEDICINE

## 2024-11-05 PROCEDURE — 1126F AMNT PAIN NOTED NONE PRSNT: CPT | Mod: HCNC,CPTII,S$GLB, | Performed by: INTERNAL MEDICINE

## 2024-11-05 NOTE — PROGRESS NOTES
Subjective   Patient ID:  Thad Maki is a 84 y.o. female who presents for follow-up of Follow-up      HPI      Diastolic CHF, HLD     Referred by Dr Masters  HISTORY OF PRESENT ILLNESS:  Thad Maki is a 83 y.o. female who presents to the clinic today for Chest Pain (X30 years) and Numbness (LESLIE hands and Rt leg and weakness x6 months)     Last seen by me 7/2023.  Accompanied by son.  Declined translation service and wishes for son to translate.     Attending PT for hip pain.     Numbness in both hands  H/o carpal tunnel surgery around 2000.  Into the palms and all fingers.     Chest pain  Feels in the right jaw and moves down to upper chest then down to abdominal pain.  Drinks some water and then helps it.  Sits for a little bit and this helps.  Not brought on by exertion.  Occurs once every 3-4 months.     Stress test 12/1/23    Normal myocardial perfusion scan. There is no evidence of myocardial ischemia or infarction.    The gated perfusion images showed an ejection fraction of 68% post stress.    There is normal wall motion post stress.     Echo 12/1/23    Left Ventricle: The left ventricle is normal in size. Normal wall thickness. Normal wall motion. There is normal systolic function with a visually estimated ejection fraction of 60 - 65%. There is diastolic dysfunction but grade cannot be determined.    Right Ventricle: Normal right ventricular cavity size. Systolic function is normal.    Tricuspid Valve: There is mild regurgitation.    Pulmonary Artery: The estimated pulmonary artery systolic pressure is 30 mmHg.     Event monitor 2/15/24    Negative event monitor with no clinical arrhythmias.    Symptoms corresponding with normal sinus rhythm.        2/5/24 Occasional sharp CP, also gets palpitations every 1-2 weeks, mild CORDOVA  BP controlled  EKG NSR - ok  CP atypical - recent echo, stress test, EKG ok  Event monitor for palpitations  Continue Rx for HLD, aortic atherosclerosis     4/23/24  Family translated. Denies CP, SOB, or further palpitations  BP controlled  Event monitor ok. Symptoms improved  Continue Rx for HLD, aortic atherosclerosis  OV 6 months     11/5/24 Denies CP or SOB. Requesting an Optho referral for intermittent spots in her vision  EKG NSR 1st degree AVB otherwise ok    Review of Systems   Constitutional: Negative for decreased appetite.   HENT:  Negative for ear discharge.    Eyes:  Negative for blurred vision.   Respiratory:  Negative for hemoptysis.    Endocrine: Negative for polyphagia.   Hematologic/Lymphatic: Negative for adenopathy.   Skin:  Negative for color change.   Musculoskeletal:  Negative for joint swelling.   Genitourinary:  Negative for bladder incontinence.   Neurological:  Negative for brief paralysis.   Psychiatric/Behavioral:  Negative for hallucinations.    Allergic/Immunologic: Negative for hives.          Objective     Physical Exam  Constitutional:       Appearance: She is well-developed.   HENT:      Head: Normocephalic and atraumatic.   Eyes:      Conjunctiva/sclera: Conjunctivae normal.      Pupils: Pupils are equal, round, and reactive to light.   Cardiovascular:      Rate and Rhythm: Normal rate.      Pulses: Intact distal pulses.      Heart sounds: Normal heart sounds.   Pulmonary:      Effort: Pulmonary effort is normal.      Breath sounds: Normal breath sounds.   Abdominal:      General: Bowel sounds are normal.      Palpations: Abdomen is soft.   Musculoskeletal:         General: Normal range of motion.      Cervical back: Normal range of motion and neck supple.   Skin:     General: Skin is warm and dry.   Neurological:      Mental Status: She is alert and oriented to person, place, and time.            Assessment and Plan     1. Palpitations    2. CORDOVA (dyspnea on exertion)    3. Aortic atherosclerosis    4. Mixed hyperlipidemia    5. Chest pain, atypical        Plan:    Will refer to Optho for recent visual changes   Continue Rx for HLD, aortic  atherosclerosis  OV 6 months    Advance Care Planning     Date: 11/05/2024  Patient did not wish or was not able to name a surrogate decision maker or provide an Advance Care Plan.

## 2025-01-14 DIAGNOSIS — Z00.00 ENCOUNTER FOR MEDICARE ANNUAL WELLNESS EXAM: ICD-10-CM

## 2025-02-18 ENCOUNTER — OFFICE VISIT (OUTPATIENT)
Dept: FAMILY MEDICINE | Facility: CLINIC | Age: 85
End: 2025-02-18
Payer: MEDICARE

## 2025-02-18 VITALS
TEMPERATURE: 98 F | WEIGHT: 188.94 LBS | SYSTOLIC BLOOD PRESSURE: 116 MMHG | OXYGEN SATURATION: 98 % | HEART RATE: 91 BPM | BODY MASS INDEX: 34.77 KG/M2 | HEIGHT: 62 IN | DIASTOLIC BLOOD PRESSURE: 72 MMHG

## 2025-02-18 DIAGNOSIS — E78.5 DYSLIPIDEMIA: ICD-10-CM

## 2025-02-18 DIAGNOSIS — I50.32 CHRONIC DIASTOLIC CONGESTIVE HEART FAILURE, NYHA CLASS 1: ICD-10-CM

## 2025-02-18 DIAGNOSIS — H53.9 VISUAL CHANGES: ICD-10-CM

## 2025-02-18 DIAGNOSIS — K21.9 GASTROESOPHAGEAL REFLUX DISEASE, UNSPECIFIED WHETHER ESOPHAGITIS PRESENT: ICD-10-CM

## 2025-02-18 DIAGNOSIS — E66.09 CLASS 1 OBESITY DUE TO EXCESS CALORIES WITH BODY MASS INDEX (BMI) OF 34.0 TO 34.9 IN ADULT, UNSPECIFIED WHETHER SERIOUS COMORBIDITY PRESENT: ICD-10-CM

## 2025-02-18 DIAGNOSIS — F33.1 MODERATE EPISODE OF RECURRENT MAJOR DEPRESSIVE DISORDER: ICD-10-CM

## 2025-02-18 DIAGNOSIS — Z86.39 H/O: OBESITY: ICD-10-CM

## 2025-02-18 DIAGNOSIS — R26.81 GAIT INSTABILITY: ICD-10-CM

## 2025-02-18 DIAGNOSIS — R35.0 URINARY FREQUENCY: ICD-10-CM

## 2025-02-18 DIAGNOSIS — E66.811 CLASS 1 OBESITY DUE TO EXCESS CALORIES WITH BODY MASS INDEX (BMI) OF 34.0 TO 34.9 IN ADULT, UNSPECIFIED WHETHER SERIOUS COMORBIDITY PRESENT: ICD-10-CM

## 2025-02-18 DIAGNOSIS — I70.0 AORTIC ATHEROSCLEROSIS: ICD-10-CM

## 2025-02-18 DIAGNOSIS — E03.9 ACQUIRED HYPOTHYROIDISM: ICD-10-CM

## 2025-02-18 DIAGNOSIS — E78.2 MIXED HYPERLIPIDEMIA: ICD-10-CM

## 2025-02-18 DIAGNOSIS — Z78.0 ASYMPTOMATIC POSTMENOPAUSAL STATE: ICD-10-CM

## 2025-02-18 DIAGNOSIS — F41.1 GENERALIZED ANXIETY DISORDER: ICD-10-CM

## 2025-02-18 DIAGNOSIS — Z00.00 ANNUAL PHYSICAL EXAM: Primary | ICD-10-CM

## 2025-02-18 DIAGNOSIS — G47.00 INSOMNIA, UNSPECIFIED TYPE: ICD-10-CM

## 2025-02-18 RX ORDER — NORTRIPTYLINE HYDROCHLORIDE 50 MG/1
50 CAPSULE ORAL NIGHTLY
Qty: 90 CAPSULE | Refills: 0 | Status: SHIPPED | OUTPATIENT
Start: 2025-02-18

## 2025-02-18 RX ORDER — LEVOTHYROXINE SODIUM 100 UG/1
100 TABLET ORAL
Qty: 90 TABLET | Refills: 0 | Status: SHIPPED | OUTPATIENT
Start: 2025-02-18

## 2025-02-18 RX ORDER — ATORVASTATIN CALCIUM 10 MG/1
10 TABLET, FILM COATED ORAL DAILY
Qty: 90 TABLET | Refills: 3 | Status: SHIPPED | OUTPATIENT
Start: 2025-02-18

## 2025-02-18 RX ORDER — OMEPRAZOLE 40 MG/1
40 CAPSULE, DELAYED RELEASE ORAL DAILY
Qty: 90 CAPSULE | Refills: 1 | Status: SHIPPED | OUTPATIENT
Start: 2025-02-18 | End: 2025-08-17

## 2025-02-18 RX ORDER — MIRTAZAPINE 45 MG/1
45 TABLET, FILM COATED ORAL NIGHTLY
Qty: 90 TABLET | Refills: 0 | Status: SHIPPED | OUTPATIENT
Start: 2025-02-18 | End: 2025-05-19

## 2025-02-18 RX ORDER — BUSPIRONE HYDROCHLORIDE 5 MG/1
5 TABLET ORAL 2 TIMES DAILY
Qty: 60 TABLET | Refills: 3 | Status: SHIPPED | OUTPATIENT
Start: 2025-02-18

## 2025-02-18 RX ORDER — DULOXETIN HYDROCHLORIDE 30 MG/1
30 CAPSULE, DELAYED RELEASE ORAL DAILY
Qty: 90 CAPSULE | Refills: 3 | Status: SHIPPED | OUTPATIENT
Start: 2025-02-18

## 2025-02-18 NOTE — PROGRESS NOTES
HISTORY OF PRESENT ILLNESS:  Thad Maki is a 84 y.o. female who presents to the clinic today for Medication Refill and Depression    Last seen by me 1/2024.     was used today.    84-year-old female presents today with worsening anxiety and depression due to missed medications.    She reports feeling nervous frequently without apparent reason, describing a sense of being out of control. She experiences low energy levels and feelings of instability, which she attributes to recently missing doses of her depression medication. She takes mirtazapine, buspirone, nortriptyline, duloxetine.  Seen by Dr. Castaneda/neurology also for memory problems.    She takes levothyroxine daily while fasting for thyroid condition and omeprazole for persistent stomach acidity and fullness. She previously experienced significant dizziness with donepezil, particularly at night and upon getting up, leading to discontinuation.    She reports frequent nocturia causing sleep disruption and requests incontinence supplies for management.    She lives with her  and does not drive. She relies on her  for transportation, with her son providing backup transportation when available.    Upcoming eye exam 3/2025.      ROS:  General: -fever, -chills, -fatigue, -weight gain, -weight loss  Eyes: -vision changes, -redness, -discharge  ENT: -ear pain, -nasal congestion, -sore throat  Cardiovascular: -chest pain, -palpitations, -lower extremity edema  Respiratory: -cough, -shortness of breath  Gastrointestinal: -abdominal pain, -nausea, -vomiting, -diarrhea, -constipation, -blood in stool  Genitourinary: -dysuria, -hematuria, -frequency, +nocturia  Musculoskeletal: -joint pain, -muscle pain  Skin: -rash, -lesion  Neurological: -headache, +dizziness, -numbness, -tingling  Psychiatric: +anxiety, +depression, +sleep difficulty, +emotional lability             PAST MEDICAL HISTORY:  Past Medical History:   Diagnosis Date    Anxiety      Depression     Gastritis     GERD (gastroesophageal reflux disease)     Hyperlipidemia     Hypothyroidism     Insomnia        PAST SURGICAL HISTORY:  Past Surgical History:   Procedure Laterality Date    CHOLECYSTECTOMY  2015/2016    KNEE ARTHROPLASTY      CAN'T REMEMBER WHAT SIDE OR WHEN       SOCIAL HISTORY:  Social History[1]    FAMILY HISTORY:  Family History   Problem Relation Name Age of Onset    Heart disease Father         ALLERGIES AND MEDICATIONS: updated and reviewed.  Review of patient's allergies indicates:  No Known Allergies  Medication List with Changes/Refills   New Medications    DIAPER,BRIEF,ADULT,DISPOSABLE MISC    Use nightly.    MIRTAZAPINE (REMERON) 45 MG TABLET    Take 1 tablet (45 mg total) by mouth every evening.   Current Medications    AMMONIUM LACTATE (LAC-HYDRIN) 12 % LOTION    Apply topically to itchy areas after bathing once daily    CALCIUM CARBONATE-VITAMIN D3 600 MG-20 MCG (800 UNIT) TAB    Take 1 tablet by mouth once daily.    CETIRIZINE (ZYRTEC) 10 MG TABLET    Take 1 tablet (10 mg total) by mouth once daily.    DICLOFENAC SODIUM (VOLTAREN) 1 % GEL    Apply 2 grams to left shoulder QID-PRN for pain    DONEPEZIL (ARICEPT) 10 MG TABLET    Take 1 tablet by mouth once daily.    FLUTICASONE PROPIONATE (FLONASE) 50 MCG/ACTUATION NASAL SPRAY    1 spray (50 mcg total) by Each Nostril route once daily.   Changed and/or Refilled Medications    Modified Medication Previous Medication    ATORVASTATIN (LIPITOR) 10 MG TABLET atorvastatin (LIPITOR) 10 MG tablet       Take 1 tablet (10 mg total) by mouth once daily.    Take 1 tablet by mouth once daily    BUSPIRONE (BUSPAR) 5 MG TAB busPIRone (BUSPAR) 5 MG Tab       Take 1 tablet (5 mg total) by mouth 2 (two) times daily.    Take 5 mg by mouth 2 (two) times daily.    DULOXETINE (CYMBALTA) 30 MG CAPSULE DULoxetine (CYMBALTA) 30 MG capsule       Take 1 capsule (30 mg total) by mouth once daily.    Take 1 capsule by mouth once daily     "LEVOTHYROXINE (EUTHYROX) 100 MCG TABLET levothyroxine (EUTHYROX) 100 MCG tablet       Take 1 tablet (100 mcg total) by mouth before breakfast.    Take 1 tablet (100 mcg total) by mouth before breakfast.    NORTRIPTYLINE (PAMELOR) 50 MG CAPSULE nortriptyline (PAMELOR) 50 MG capsule       Take 1 capsule (50 mg total) by mouth every evening.    Take 1 capsule (50 mg total) by mouth every evening.    OMEPRAZOLE (PRILOSEC) 40 MG CAPSULE omeprazole (PRILOSEC) 40 MG capsule       Take 1 capsule (40 mg total) by mouth once daily.    Take 1 capsule (40 mg total) by mouth once daily.   Discontinued Medications    AZITHROMYCIN (Z-ALLY) 250 MG TABLET    Take 250 mg by mouth once.    MIRTAZAPINE (REMERON) 30 MG TABLET    Take 1 tablet (30 mg total) by mouth every evening.          CARE TEAM:  Patient Care Team:  Guerrero Lino MD as PCP - General (Internal Medicine)         PHYSICAL EXAM:   Vitals:    02/18/25 1041   BP: 116/72   Pulse: 91   Temp: 98.2 °F (36.8 °C)     Weight: 85.7 kg (188 lb 15 oz)   Height: 5' 2" (157.5 cm)   Body mass index is 34.56 kg/m².    Physical Exam    General: No acute distress. Well-developed. Well-nourished.  Eyes: EOMI. Sclerae anicteric.  HENT: Normocephalic. Atraumatic. Nares patent. Moist oral mucosa.  Ears: Bilateral TMs clear. Bilateral EACs clear.  Cardiovascular: Regular rate. Regular rhythm. No murmurs. No rubs. No gallops. Normal S1, S2.  Respiratory: Normal respiratory effort. Clear to auscultation bilaterally. No rales. No rhonchi. No wheezing.  Abdomen: Soft. Non-tender. Non-distended. Normoactive bowel sounds.  Musculoskeletal: No  obvious deformity.  Extremities: No lower extremity edema.  Neurological: Alert & oriented x3. No slurred speech. Normal gait.  Psychiatric: Normal mood. Normal affect. Good insight. Good judgment.  Skin: Warm. Dry. No rash.             ASSESSMENT AND PLAN:  Assessment & Plan    IMPRESSION:  - Assessed current medication regimen for depression, anxiety, and " insomnia  - Reviewed recent neurological and cardiac workup results, including MRI showing age-related brain atrophy  - Evaluated reported dizziness with Donepezil  - Considered request for mobility aid and incontinence supplies  - Recommend bone density scan to reassess osteoporosis status      Annual physical exam  -     Hemoglobin A1C; Future; Expected date: 02/18/2025  -     Comprehensive Metabolic Panel; Future; Expected date: 02/18/2025  -     Lipid Panel; Future; Expected date: 02/18/2025  -     CBC Auto Differential; Future; Expected date: 02/18/2025  -     TSH; Future; Expected date: 02/18/2025  -     Vitamin D; Future; Expected date: 02/18/2025  -     DXA Bone Density Axial Skeleton 1 or more sites; Future; Expected date: 02/18/2025  -     influenza (adjuvanted) (Fluad) 45 mcg/0.5 mL IM vaccine (> or = 64 yo) 0.5 mL    Moderate episode of recurrent major depressive disorder  -     mirtazapine (REMERON) 45 MG tablet; Take 1 tablet (45 mg total) by mouth every evening.  Dispense: 90 tablet; Refill: 0  -     DULoxetine (CYMBALTA) 30 MG capsule; Take 1 capsule (30 mg total) by mouth once daily.  Dispense: 90 capsule; Refill: 3    Generalized anxiety disorder  -     busPIRone (BUSPAR) 5 MG Tab; Take 1 tablet (5 mg total) by mouth 2 (two) times daily.  Dispense: 60 tablet; Refill: 3    Visual changes       - Follow up eye clinic.    Aortic atherosclerosis  Mixed hyperlipidemia  -     Comprehensive Metabolic Panel; Future; Expected date: 02/18/2025  -     Lipid Panel; Future; Expected date: 02/18/2025  - Stable on current medical management.    Acquired hypothyroidism  -     TSH; Future; Expected date: 02/18/2025  -     T4, Free; Future; Expected date: 02/18/2025  -     levothyroxine (EUTHYROX) 100 MCG tablet; Take 1 tablet (100 mcg total) by mouth before breakfast.  Dispense: 90 tablet; Refill: 0    Chronic diastolic congestive heart failure, NYHA class 1    Gastroesophageal reflux disease, unspecified whether  esophagitis present  -     omeprazole (PRILOSEC) 40 MG capsule; Take 1 capsule (40 mg total) by mouth once daily.  Dispense: 90 capsule; Refill: 1    Class 1 obesity due to excess calories with body mass index (BMI) of 34.0 to 34.9 in adult, unspecified whether serious comorbidity present       - continue efforts for healthy eating (focus on lean protein, veggies, whole grain, adequate fiber intake, minimize snacking) and exercise    Asymptomatic postmenopausal state  -     DXA Bone Density Axial Skeleton 1 or more sites; Future; Expected date: 02/18/2025    Insomnia, unspecified type  -     mirtazapine (REMERON) 45 MG tablet; Take 1 tablet (45 mg total) by mouth every evening.  Dispense: 90 tablet; Refill: 0  -     nortriptyline (PAMELOR) 50 MG capsule; Take 1 capsule (50 mg total) by mouth every evening.  Dispense: 90 capsule; Refill: 0    Dyslipidemia  -     atorvastatin (LIPITOR) 10 MG tablet; Take 1 tablet (10 mg total) by mouth once daily.  Dispense: 90 tablet; Refill: 3    H/O: obesity  -     Hemoglobin A1C; Future; Expected date: 02/18/2025  -     CBC Auto Differential; Future; Expected date: 02/18/2025  -     Vitamin D; Future; Expected date: 02/18/2025    Body mass index (BMI) 34.0-34.9, adult  -     Vitamin D; Future; Expected date: 02/18/2025    Gait instability  -     WALKER FOR HOME USE  -     Ambulatory referral/consult to Physical/Occupational Therapy; Future; Expected date: 02/25/2025    Urinary frequency  -     Ambulatory referral/consult to Urology; Future; Expected date: 02/25/2025  -     diaper,brief,adult,disposable Misc; Use nightly.  Dispense: 96 each; Refill: 5    - Assessed patient's mobility needs and prescribed a walker with a chair for assistance.  - Referred for physical therapy to improve mobility and overall health.  - Ordered labs to evaluate general health status and recommended repeating bone density scan to check for osteoporosis, which can be related to obesity and age.    -  Continued Mirtazapine 45mg nightly, Duloxetine, and Nortriptyline 50mg nightly.  - Addressed missed depression medication.  - Reviewed current medications and confirmed dosages with the patient.    - Continued Buspirone twice daily.    - Continued Omeprazole for stomach acidity.    - Continued Levothyroxine 100 mcg daily, to be taken on an empty stomach in the morning.    - Noted patient's report of memory issues and dizziness with Donepezil.  - Reviewed MRI results showing age-related brain atrophy.  - Advised discussing Donepezil side effects with neurologist Dr. Chavo Castaneda at the May appointment.  - Noted that the patient has put Donepezil 10mg daily on hold due to side effects.    - Continued low-dose Atorvastatin for stable condition.    - Ordered adult diapers (pending insurance coverage) for nighttime use.  - Referred the patient to a urologist for evaluation of frequent nighttime urination.  - Noted patient's report of waking up multiple times at night to urinate.    - Explained the bone density scan procedure and its importance in monitoring osteoporosis.    - Flu shot administered during current visit.  - Discussed the continued importance of flu vaccination during the current season.    - Fasting labs ordered for future visit.    - Follow up in 3 months or sooner as needed.    This note was generated with the assistance of ambient listening technology. Verbal consent was obtained by the patient and accompanying visitor(s) for the recording of patient appointment to facilitate this note. I attest to having reviewed and edited the generated note for accuracy, though some syntax or spelling errors may persist. Please contact the author of this note for any clarification.         [1]   Social History  Socioeconomic History    Marital status:    Tobacco Use    Smoking status: Never    Smokeless tobacco: Never   Substance and Sexual Activity    Alcohol use: Never    Drug use: Never    Sexual activity:  Not Currently     Partners: Male     Social Drivers of Health     Financial Resource Strain: Low Risk  (2/14/2024)    Overall Financial Resource Strain (CARDIA)     Difficulty of Paying Living Expenses: Not hard at all   Food Insecurity: Food Insecurity Present (2/14/2024)    Hunger Vital Sign     Worried About Running Out of Food in the Last Year: Sometimes true     Ran Out of Food in the Last Year: Never true   Transportation Needs: Unmet Transportation Needs (2/14/2024)    PRAPARE - Transportation     Lack of Transportation (Medical): No     Lack of Transportation (Non-Medical): Yes   Physical Activity: Unknown (2/14/2024)    Exercise Vital Sign     Days of Exercise per Week: 1 day   Stress: Stress Concern Present (2/14/2024)    [place] [employer] - Occupational Stress Questionnaire     Feeling of Stress : To some extent   Housing Stability: Unknown (2/14/2024)    Housing Stability Vital Sign     Unable to Pay for Housing in the Last Year: No     Unstable Housing in the Last Year: No

## 2025-02-19 ENCOUNTER — LAB VISIT (OUTPATIENT)
Dept: LAB | Facility: HOSPITAL | Age: 85
End: 2025-02-19
Attending: INTERNAL MEDICINE
Payer: MEDICARE

## 2025-02-19 DIAGNOSIS — E78.2 MIXED HYPERLIPIDEMIA: ICD-10-CM

## 2025-02-19 DIAGNOSIS — E03.9 ACQUIRED HYPOTHYROIDISM: ICD-10-CM

## 2025-02-19 DIAGNOSIS — Z86.39 H/O: OBESITY: ICD-10-CM

## 2025-02-19 DIAGNOSIS — Z00.00 ANNUAL PHYSICAL EXAM: ICD-10-CM

## 2025-02-19 LAB
25(OH)D3+25(OH)D2 SERPL-MCNC: 36 NG/ML (ref 30–96)
ALBUMIN SERPL BCP-MCNC: 4.3 G/DL (ref 3.5–5.2)
ALP SERPL-CCNC: 86 U/L (ref 40–150)
ALT SERPL W/O P-5'-P-CCNC: 20 U/L (ref 10–44)
ANION GAP SERPL CALC-SCNC: 12 MMOL/L (ref 8–16)
AST SERPL-CCNC: 24 U/L (ref 10–40)
BASOPHILS # BLD AUTO: 0.02 K/UL (ref 0–0.2)
BASOPHILS NFR BLD: 0.3 % (ref 0–1.9)
BILIRUB SERPL-MCNC: 0.4 MG/DL (ref 0.1–1)
BUN SERPL-MCNC: 13 MG/DL (ref 8–23)
CALCIUM SERPL-MCNC: 9.7 MG/DL (ref 8.7–10.5)
CHLORIDE SERPL-SCNC: 105 MMOL/L (ref 95–110)
CHOLEST SERPL-MCNC: 169 MG/DL (ref 120–199)
CHOLEST/HDLC SERPL: 3 {RATIO} (ref 2–5)
CO2 SERPL-SCNC: 26 MMOL/L (ref 23–29)
CREAT SERPL-MCNC: 0.8 MG/DL (ref 0.5–1.4)
DIFFERENTIAL METHOD BLD: ABNORMAL
EOSINOPHIL # BLD AUTO: 0.2 K/UL (ref 0–0.5)
EOSINOPHIL NFR BLD: 3.1 % (ref 0–8)
ERYTHROCYTE [DISTWIDTH] IN BLOOD BY AUTOMATED COUNT: 15.2 % (ref 11.5–14.5)
EST. GFR  (NO RACE VARIABLE): >60 ML/MIN/1.73 M^2
ESTIMATED AVG GLUCOSE: 117 MG/DL (ref 68–131)
GLUCOSE SERPL-MCNC: 109 MG/DL (ref 70–110)
HBA1C MFR BLD: 5.7 % (ref 4–5.6)
HCT VFR BLD AUTO: 44.1 % (ref 37–48.5)
HDLC SERPL-MCNC: 57 MG/DL (ref 40–75)
HDLC SERPL: 33.7 % (ref 20–50)
HGB BLD-MCNC: 14.1 G/DL (ref 12–16)
IMM GRANULOCYTES # BLD AUTO: 0.02 K/UL (ref 0–0.04)
IMM GRANULOCYTES NFR BLD AUTO: 0.3 % (ref 0–0.5)
LDLC SERPL CALC-MCNC: 89 MG/DL (ref 63–159)
LYMPHOCYTES # BLD AUTO: 2 K/UL (ref 1–4.8)
LYMPHOCYTES NFR BLD: 27.2 % (ref 18–48)
MCH RBC QN AUTO: 27.8 PG (ref 27–31)
MCHC RBC AUTO-ENTMCNC: 32 G/DL (ref 32–36)
MCV RBC AUTO: 87 FL (ref 82–98)
MONOCYTES # BLD AUTO: 0.7 K/UL (ref 0.3–1)
MONOCYTES NFR BLD: 8.7 % (ref 4–15)
NEUTROPHILS # BLD AUTO: 4.6 K/UL (ref 1.8–7.7)
NEUTROPHILS NFR BLD: 60.4 % (ref 38–73)
NONHDLC SERPL-MCNC: 112 MG/DL
NRBC BLD-RTO: 0 /100 WBC
PLATELET # BLD AUTO: 239 K/UL (ref 150–450)
PMV BLD AUTO: 12.5 FL (ref 9.2–12.9)
POTASSIUM SERPL-SCNC: 3.9 MMOL/L (ref 3.5–5.1)
PROT SERPL-MCNC: 8.1 G/DL (ref 6–8.4)
RBC # BLD AUTO: 5.08 M/UL (ref 4–5.4)
SODIUM SERPL-SCNC: 143 MMOL/L (ref 136–145)
T4 FREE SERPL-MCNC: 0.96 NG/DL (ref 0.71–1.51)
TRIGL SERPL-MCNC: 115 MG/DL (ref 30–150)
TSH SERPL DL<=0.005 MIU/L-ACNC: 2.92 UIU/ML (ref 0.4–4)
WBC # BLD AUTO: 7.51 K/UL (ref 3.9–12.7)

## 2025-02-19 PROCEDURE — 36415 COLL VENOUS BLD VENIPUNCTURE: CPT | Mod: HCNC,PN | Performed by: INTERNAL MEDICINE

## 2025-02-19 PROCEDURE — 85025 COMPLETE CBC W/AUTO DIFF WBC: CPT | Mod: HCNC | Performed by: INTERNAL MEDICINE

## 2025-02-19 PROCEDURE — 84439 ASSAY OF FREE THYROXINE: CPT | Mod: HCNC | Performed by: INTERNAL MEDICINE

## 2025-02-19 PROCEDURE — 83036 HEMOGLOBIN GLYCOSYLATED A1C: CPT | Mod: HCNC | Performed by: INTERNAL MEDICINE

## 2025-02-19 PROCEDURE — 82306 VITAMIN D 25 HYDROXY: CPT | Mod: HCNC | Performed by: INTERNAL MEDICINE

## 2025-02-19 PROCEDURE — 84443 ASSAY THYROID STIM HORMONE: CPT | Mod: HCNC | Performed by: INTERNAL MEDICINE

## 2025-02-19 PROCEDURE — 80061 LIPID PANEL: CPT | Mod: HCNC | Performed by: INTERNAL MEDICINE

## 2025-02-19 PROCEDURE — 80053 COMPREHEN METABOLIC PANEL: CPT | Mod: HCNC | Performed by: INTERNAL MEDICINE

## 2025-02-20 ENCOUNTER — OFFICE VISIT (OUTPATIENT)
Dept: UROLOGY | Facility: CLINIC | Age: 85
End: 2025-02-20
Payer: MEDICARE

## 2025-02-20 VITALS — BODY MASS INDEX: 34.52 KG/M2 | WEIGHT: 188.69 LBS

## 2025-02-20 DIAGNOSIS — N39.41 URGE INCONTINENCE: ICD-10-CM

## 2025-02-20 DIAGNOSIS — N32.81 OAB (OVERACTIVE BLADDER): Primary | ICD-10-CM

## 2025-02-20 DIAGNOSIS — R35.1 NOCTURIA: ICD-10-CM

## 2025-02-20 PROCEDURE — 99999 PR PBB SHADOW E&M-EST. PATIENT-LVL III: CPT | Mod: PBBFAC,HCNC,, | Performed by: UROLOGY

## 2025-02-20 RX ORDER — MIRABEGRON 50 MG/1
50 TABLET, FILM COATED, EXTENDED RELEASE ORAL DAILY
Qty: 30 TABLET | Refills: 11 | Status: SHIPPED | OUTPATIENT
Start: 2025-02-20 | End: 2026-02-20

## 2025-02-20 NOTE — PROGRESS NOTES
Subjective:       Thad Maki is a 84 y.o. female who is a new patient who was referred by Dr. Guerrero Lino  for evaluation of frequency. [facility] services used for visit.     She reports urinary frequency, mainly nocturia x 2-3. Present x 3-4 yrs. She reports urgency with UUI. Denies nocturnal enuresis. No prior attempts at treatment. Denies dysuria, hematuria, UTIs. Denies LESLIE. Denies d/c.     PVR (bladder scan) today - 49cc (not true PVR, unable to void)      The following portions of the patient's history were reviewed and updated as appropriate: allergies, current medications, past family history, past medical history, past social history, past surgical history and problem list.    Review of Systems  12 point review of systems completed. Pertinent positive and negatives listed in HPI        Objective:    Vitals: Wt 85.6 kg (188 lb 11.4 oz)   BMI 34.52 kg/m²     Physical Exam   General: well developed, well nourished in no acute distress  Head: normocephalic, atraumatic  Neck: no obvious enlargement of thyroid  HEENT: EOMI, mucus membranes moist, sclera anicteric, no hearing impairment  Lungs: symmetric expansion, non-labored breathing  Neuro: alert and oriented x 3, no gross deficits  Psych: normal judgment and insight, normal mood/affect and non-anxious  Genitourinary:   deferred      Lab Review   Urine analysis today in clinic shows - unable to void     Lab Results   Component Value Date    WBC 7.51 02/19/2025    HGB 14.1 02/19/2025    HCT 44.1 02/19/2025    MCV 87 02/19/2025     02/19/2025     Lab Results   Component Value Date    CREATININE 0.8 02/19/2025    BUN 13 02/19/2025         Imaging  NA         Assessment/Plan:      1. OAB (overactive bladder)    - Trial Myrbetriq. Call if coverage issues. Discussed poss SE.      2. Urge incontinence    - UUI: Behavioral changes, PFPT, anticholinergic medication, b-agonist medication. Botox/sacral neuromodulation for refractory UUI.      3. Nocturia    - Reduce PM fluids   - Myrbetriq         Follow up in 2 months

## 2025-02-24 ENCOUNTER — HOSPITAL ENCOUNTER (OUTPATIENT)
Dept: RADIOLOGY | Facility: CLINIC | Age: 85
Discharge: HOME | End: 2025-02-24
Attending: INTERNAL MEDICINE
Payer: MEDICARE

## 2025-02-24 DIAGNOSIS — Z00.00 ANNUAL PHYSICAL EXAM: ICD-10-CM

## 2025-02-24 DIAGNOSIS — Z78.0 ASYMPTOMATIC POSTMENOPAUSAL STATE: ICD-10-CM

## 2025-02-24 PROCEDURE — 77080 DXA BONE DENSITY AXIAL: CPT | Mod: TC,HCNC,PO

## 2025-02-24 PROCEDURE — 77080 DXA BONE DENSITY AXIAL: CPT | Mod: 26,HCNC,, | Performed by: INTERNAL MEDICINE

## 2025-02-25 ENCOUNTER — RESULTS FOLLOW-UP (OUTPATIENT)
Dept: FAMILY MEDICINE | Facility: CLINIC | Age: 85
End: 2025-02-25

## 2025-03-07 ENCOUNTER — OFFICE VISIT (OUTPATIENT)
Dept: OPTOMETRY | Facility: CLINIC | Age: 85
End: 2025-03-07
Payer: MEDICARE

## 2025-03-07 DIAGNOSIS — Z01.00 ROUTINE EYE EXAM: Primary | ICD-10-CM

## 2025-03-07 DIAGNOSIS — Z96.1 PSEUDOPHAKIA: ICD-10-CM

## 2025-03-07 DIAGNOSIS — H53.9 VISUAL CHANGES: ICD-10-CM

## 2025-03-07 DIAGNOSIS — H52.7 REFRACTIVE ERROR: ICD-10-CM

## 2025-03-07 PROCEDURE — 99999 PR PBB SHADOW E&M-EST. PATIENT-LVL III: CPT | Mod: PBBFAC,HCNC,, | Performed by: OPTOMETRIST

## 2025-03-07 NOTE — PROGRESS NOTES
Subjective:       Patient ID: Thad Maki is a 84 y.o. female      Chief Complaint   Patient presents with    Concerns About Ocular Health     History of Present Illness    Dls: ? Yrs     85 y/o female presents today with c/o blurry vision at near ou.  Pt wears otc readers     + ou tearing  No itching  No burning  No pain  + ha's  + ou off/on floaters  No flashes    Eye meds  None    Pohx:   S/p CE Bilateral     Fohx:   Glaucoma - mother          Assessment/Plan:     [facility] [id number]    1. Routine eye exam (Primary)  [facility]    2. Refractive error  Educated patient on refractive error and discussed lens options. Dispensed updated spectacle Rx. Educated about adaptation period to new specs.    Eyeglass Final Rx       Eyeglass Final Rx         Sphere Cylinder Axis Add    Right -1.00 +1.25 030 +2.50    Left -0.75 +0.75 160 +2.50      Expiration Date: 3/7/2026                      3. Pseudophakia  Clear OD. NVS PCO OS. Monitor.    4. Visual changes  - Ambulatory referral/consult to Ophthalmology    No follow-ups on file.

## 2025-03-25 ENCOUNTER — CLINICAL SUPPORT (OUTPATIENT)
Dept: REHABILITATION | Facility: HOSPITAL | Age: 85
End: 2025-03-25
Attending: INTERNAL MEDICINE
Payer: MEDICARE

## 2025-03-25 DIAGNOSIS — R26.81 GAIT INSTABILITY: ICD-10-CM

## 2025-03-25 DIAGNOSIS — Z74.09 IMPAIRED FUNCTIONAL MOBILITY AND ACTIVITY TOLERANCE: Primary | ICD-10-CM

## 2025-03-25 PROCEDURE — 97161 PT EVAL LOW COMPLEX 20 MIN: CPT | Mod: HCNC,PN

## 2025-03-25 NOTE — PROGRESS NOTES
Physical Therapy Evaluation    Patient Name: Thad Maki  MRN: [id number]  YOB: 1940  Encounter Date: 3/25/2025    Therapy Diagnosis:   Encounter Diagnoses   Name Primary?    Gait instability     Impaired functional mobility and activity tolerance Yes     Physician: Guerrero Lino MD    Physician Orders: Eval and Treat  Medical Diagnosis: Gait instability    Visit # / Visits Authorized:  1 / 1  Date of Evaluation: 3/25/2025  Insurance Authorization Period: 2/18/2025 to 12/31/2025  Plan of Care Certification:  3/25/2025 to 6/25/25     Time In: 1000   Time Out: 1035  Total Time: 35   Total Billable Time: 35 minutes    Intake Outcome Measure for FOTO Survey    Therapist reviewed FOTO scores for Thad Maki on 3/25/2025.   FOTO report - see Media section or FOTO account episode details.     Intake Score: 43%         Subjective   History of Present Illness  Thad is a 84 y.o. female who reports to physical therapy with a chief concern of difficulty walking.                 History of Present Condition/Illness: Patient reports that she is having difficulty with her gait. She reports that she feels like when she takes a step she will fall occasionally. She reports that this has been happening for a couple of years. She reports pain in both her knees. She reports that she has fallen in the past, but not for four years. She has a cane that she uses at times, but not all the time, mostly in the community. She has ordered a walker to get a rollator so she can sit and rest. She has difficulty with showering, cooking.     Activities of Daily Living  Social history was obtained from Patient.               Previously independent with activities of daily living? Yes     Currently independent with activities of daily living? Yes          Previously independent with instrumental activities of daily living? Yes     Currently independent with instrumental activities of daily living? No  Activities  currently needing assistance include: Meal prep and Driving.            Pain     Patient reports a current pain level of 3/10. Pain at best is reported as 1/10. Pain at worst is reported as 5/10.   Location: B knees  Clinical Progression (since onset): Stable  Pain Qualities: Aching, Dull  Pain-Relieving Factors: Activity modification, Relaxation, Rest  Pain-Aggravating Factors: Standing, Walking, Cooking         Living Arrangements  Living Situation  Housing: Home independently  Living Arrangements: Spouse/significant other  Support Systems: Family members, Children    Home Setup  Type of Structure: House  Home Access: Level entry        Employment  Employment Status: Retired          Past Medical History/Physical Systems Review:   Thad Maki  has a past medical history of Anxiety, Depression, Gastritis, GERD (gastroesophageal reflux disease), Hyperlipidemia, Hypothyroidism, and Insomnia.    Thad Maki  has a past surgical history that includes Cholecystectomy (2015/2016); Knee Arthroplasty; and Cataract extraction (Bilateral).    Thad has a current medication list which includes the following prescription(s): ammonium lactate, atorvastatin, buspirone, calcium carbonate-vitamin d3, cetirizine, diaper,brief,adult,disposable, diclofenac sodium, donepezil, duloxetine, fluticasone propionate, levothyroxine, mirtazapine, myrbetriq, nortriptyline, and omeprazole.    Review of patient's allergies indicates:  No Known Allergies     Objective      Lower Extremity Sensation  Right Lumbar/Lower Extremity Sensation  Intact: Light Touch       Left Lumbar/Lower Extremity Sensation  Intact: Light Touch                      Hip Strength - Planes of Motion   Right Strength Right Pain Left Strength Left  Pain   Flexion (L2) 4   4     Extension 4   4     ABduction 4   4     ADduction 4   4     Internal Rotation 4   4     External Rotation 4   4         Knee Strength   Right Strength Right Pain Left Strength  Left  Pain   Flexion (S2) 4   4     Prone Flexion 4   4     Extension (L3) 4   4            Ankle/Foot Strength - Planes of Motion   Right Strength Right Pain Left Strength Left  Pain   Dorsiflexion (L4) 4   4     Plantar Flexion (S1) 4   4     Inversion           Eversion           Great Toe Flexion           Great Toe Extension (L5)           Lesser Toes Flexion           Lesser Toes Extension                  Coordination  Balance  Intact: Static Sitting, Dynamic Sitting, and Static Standing  Impaired: Dynamic Standing                  Fall Risk  Functional mobility test results suggest the patient is not: At Risk for Falls  Timed Up & Go (TUG)  Time: 17.16 seconds  Observations: Short strides and Shuffling  An older adult who takes >=12 seconds to complete the TUG is at risk for falling.       Sit to Stand Testing      The patient completed 9 repetitions of a sit to stand transfer in 30 seconds. used arms         Gait Analysis  Base of Support: Narrow  Walking Speed: Decreased    Right Side Walking Observations  Decreased: Step Length  Right Foot Contact Pattern: Flat foot    Left Side Walking Observations  Decreased: Step Length  Left Foot Contact Pattern: Flat foot       Time Entry(in minutes):  PT Evaluation (Low) Time Entry: 25    Assessment & Plan   Assessment  Thad presents with a condition of Low complexity.   Presentation of Symptoms: Stable       Functional Limitations: Activity tolerance, Ambulating on uneven surfaces, Pain with ADLs/IADLs, Functional mobility, Gait limitations, Increased risk of fall, Maintaining balance  Impairments: Abnormal gait, Activity intolerance, Impaired balance, Impaired physical strength    Patient Goal for Therapy (PT): To be safer with ambulation  Prognosis: Good  Assessment Details: Thad is a 84 y.o. female who presents to PT with signs and symptoms consistent with referring diagnosis. They present with decreased LE strength, impaired motor control, and increased  pain. They are functionally limited in mobility and ADLs.  Will work on improving safety and fucntional independence. Patient was educated on plan of care and consented to treatment. No precautions or contraindications to therapy were identified. Patient will benefit from skilled PT intervention to address functional deficits identified and return to prior level of function.      Plan  From a physical therapy perspective, the patient would benefit from: Skilled Rehab Services    Planned therapy interventions include: Therapeutic exercise, Neuromuscular re-education, Therapeutic activities, Manual therapy, and Gait training.            Visit Frequency: 1 times Per Week for 12 Weeks.       This plan was discussed with Patient.   Discussion participants: Agreed Upon Plan of Care             Patient's spiritual, cultural, and educational needs considered and patient agreeable to plan of care and goals.           Goals:   Active       Ambulation/movement       Patient will walk 250 feet with rollator with Mod I       Start:  03/25/25    Expected End:  04/29/25               Functional outcome       Patient will show a significant change in FOTO patient-reported outcome tool to demonstrate subjective improvement       Start:  03/25/25    Expected End:  04/29/25            Patient stated goal: To be safer with ambulation        Start:  03/25/25    Expected End:  04/29/25            Patient will demonstrate independence in home program for support of progression       Start:  03/25/25    Expected End:  04/29/25               Pain       Patient will report pain of 1/10 demonstrating a reduction of overall pain       Start:  03/25/25    Expected End:  04/29/25            Patient will report a 2 point reduction in pain while performing showering       Start:  03/25/25    Expected End:  04/29/25                Vasquez Adan PT

## 2025-04-02 ENCOUNTER — CLINICAL SUPPORT (OUTPATIENT)
Dept: REHABILITATION | Facility: HOSPITAL | Age: 85
End: 2025-04-02
Payer: MEDICARE

## 2025-04-02 DIAGNOSIS — Z74.09 IMPAIRED FUNCTIONAL MOBILITY AND ACTIVITY TOLERANCE: Primary | ICD-10-CM

## 2025-04-02 PROCEDURE — 97530 THERAPEUTIC ACTIVITIES: CPT | Mod: HCNC,PN

## 2025-04-02 PROCEDURE — 97110 THERAPEUTIC EXERCISES: CPT | Mod: HCNC,PN

## 2025-04-02 NOTE — PROGRESS NOTES
"Physical Therapy Visit    Patient Name: Thad Maki  MRN: [id number]  YOB: 1940  Encounter Date: 4/2/2025    Therapy Diagnosis: No diagnosis found.  Physician: Guerrero Lino MD    Physician Orders: Eval and Treat  Medical Diagnosis: Gait instability    Visit # / Visits Authorized:  1 / 12  Insurance Authorization Period: 3/25/2025 to 12/31/2025  Date of Evaluation: 3/25/25  Plan of Care Certification: 3/25/25 to 6/25/25     PT/PTA:     Number of PTA visits since last PT visit:   Time In: 1100   Time Out: 1150  Total Time: 50   Total Billable Time: 23    FOTO:  Intake Score: 43%  Survey Score 1:  %  Survey Score 2:  %         Subjective   Patient reports that she is doing okay today. Slight pain to knees..  Pain reported as 3/10. B knees    Objective            Treatment:  Therapeutic Exercise  TE 1: LTR 3x10  TE 2: HL hip adduction with ball 3x10x3"  TE 3: HL clams RTB 3x10x3"  TE 4: Bridges RTB 3x10  TE 5: Seated marches 3x10 ea  TE 6: LAQ 3x10  Therapeutic Activity  TA 1: Standing hip 3 way 3x10 ea  TA 2: step ups 3x10  TA 3: STS 20" 3x10  TA 4: Shuttle DL 1.5 bands 3x10    Time Entry(in minutes):  Therapeutic Activity Time Entry: 10  Therapeutic Exercise Time Entry: 13    Assessment & Plan   Assessment: Patient presents for first follow up. Reports no major change since initial evaluation. Initiated exercises to help strengthen core and LE. Good response noted today. Will continue to progress as able  Evaluation/Treatment Tolerance: Patient tolerated treatment well    Patient will continue to benefit from skilled outpatient physical therapy to address the deficits listed in the problem list box on initial evaluation, provide pt/family education and to maximize pt's level of independence in the home and community environment.     Patient's spiritual, cultural, and educational needs considered and patient agreeable to plan of care and goals.           Plan: Continue POC    Goals:   Active  "      Ambulation/movement       Patient will walk 250 feet with rollator with Mod I (Progressing)       Start:  03/25/25    Expected End:  04/29/25               Functional outcome       Patient will show a significant change in FOTO patient-reported outcome tool to demonstrate subjective improvement (Progressing)       Start:  03/25/25    Expected End:  04/29/25            Patient stated goal: To be safer with ambulation  (Progressing)       Start:  03/25/25    Expected End:  04/29/25            Patient will demonstrate independence in home program for support of progression (Progressing)       Start:  03/25/25    Expected End:  04/29/25               Pain       Patient will report pain of 1/10 demonstrating a reduction of overall pain (Progressing)       Start:  03/25/25    Expected End:  04/29/25            Patient will report a 2 point reduction in pain while performing showering (Progressing)       Start:  03/25/25    Expected End:  04/29/25                Vasquez Adan, PT

## 2025-04-16 ENCOUNTER — CLINICAL SUPPORT (OUTPATIENT)
Dept: REHABILITATION | Facility: HOSPITAL | Age: 85
End: 2025-04-16
Payer: MEDICARE

## 2025-04-16 DIAGNOSIS — Z74.09 IMPAIRED FUNCTIONAL MOBILITY AND ACTIVITY TOLERANCE: Primary | ICD-10-CM

## 2025-04-16 PROCEDURE — 97110 THERAPEUTIC EXERCISES: CPT | Mod: HCNC,PN

## 2025-04-16 PROCEDURE — 97530 THERAPEUTIC ACTIVITIES: CPT | Mod: HCNC,PN

## 2025-04-23 NOTE — PROGRESS NOTES
"Physical Therapy Visit    Patient Name: Thad Maki  MRN: [id number]  YOB: 1940  Encounter Date: 4/16/2025    Therapy Diagnosis:   Encounter Diagnosis   Name Primary?    Impaired functional mobility and activity tolerance Yes     Physician: Guerrero Lino MD    Physician Orders: Eval and Treat  Medical Diagnosis: Gait instability    Visit # / Visits Authorized:  2 / 12  Insurance Authorization Period: 3/25/2025 to 12/31/2025  Date of Evaluation: 3/25/25  Plan of Care Certification: 3/25/25 to 6/25/25     Time In: 1000   Time Out: 1100  Total Time: 60   Total Billable Time: 23         Subjective   Patient reports that she was pretty sore after last visit. She does feel some relief immediately after exercises..  Pain reported as 4/10. generalized lumbar spine    Objective            Treatment:  Therapeutic Exercise  TE 1: LTR 3x10  TE 2: HL hip adduction with ball 3x10x3"  TE 3: HL clams RTB 3x10x3"  TE 4: Bridges RTB 3x10  TE 5: Seated marches 3x10 ea  TE 6: LAQ 3x10  Balance/Neuromuscular Re-Education  NMR 1: PPT 3x10x5"  NMR 2: TA 3x10x5"  NMR 3: Supine marches 3x10x3"  NMR 4: Bridging 3x10x3"  NMR 5: BKFO RTB 3x10x3"  Therapeutic Activity  TA 1: Nustep 10'  TA 3: STS 20" 3x10  TA 4: Shuttle DL 1.5 bands 3x10    Time Entry(in minutes):  Therapeutic Activity Time Entry: 10  Therapeutic Exercise Time Entry: 13    Assessment & Plan   Assessment: Patient tolerated treatment well today. Reports some soreness following prior visit, but reports overall improvement. Continued working on movements to strengthen core and LE. Will progress as able.  Evaluation/Treatment Tolerance: Patient tolerated treatment well    Patient will continue to benefit from skilled outpatient physical therapy to address the deficits listed in the problem list box on initial evaluation, provide pt/family education and to maximize pt's level of independence in the home and community environment.     Patient's spiritual, " cultural, and educational needs considered and patient agreeable to plan of care and goals.           Plan: Continue POC    Goals:   Active       Ambulation/movement       Patient will walk 250 feet with rollator with Mod I (Progressing)       Start:  03/25/25    Expected End:  04/29/25               Functional outcome       Patient will show a significant change in FOTO patient-reported outcome tool to demonstrate subjective improvement (Progressing)       Start:  03/25/25    Expected End:  04/29/25            Patient stated goal: To be safer with ambulation  (Progressing)       Start:  03/25/25    Expected End:  04/29/25            Patient will demonstrate independence in home program for support of progression (Progressing)       Start:  03/25/25    Expected End:  04/29/25               Pain       Patient will report pain of 1/10 demonstrating a reduction of overall pain (Progressing)       Start:  03/25/25    Expected End:  04/29/25            Patient will report a 2 point reduction in pain while performing showering (Progressing)       Start:  03/25/25    Expected End:  04/29/25                Vasquez Adan, PT

## 2025-04-28 ENCOUNTER — CLINICAL SUPPORT (OUTPATIENT)
Dept: REHABILITATION | Facility: HOSPITAL | Age: 85
End: 2025-04-28
Payer: MEDICARE

## 2025-04-28 DIAGNOSIS — Z74.09 IMPAIRED FUNCTIONAL MOBILITY AND ACTIVITY TOLERANCE: Primary | ICD-10-CM

## 2025-04-28 PROCEDURE — 97112 NEUROMUSCULAR REEDUCATION: CPT | Mod: HCNC,PN

## 2025-04-28 PROCEDURE — 97530 THERAPEUTIC ACTIVITIES: CPT | Mod: HCNC,PN

## 2025-05-05 ENCOUNTER — OFFICE VISIT (OUTPATIENT)
Dept: UROLOGY | Facility: CLINIC | Age: 85
End: 2025-05-05
Payer: MEDICARE

## 2025-05-05 VITALS — BODY MASS INDEX: 35.2 KG/M2 | WEIGHT: 192.44 LBS

## 2025-05-05 DIAGNOSIS — N32.81 OAB (OVERACTIVE BLADDER): Primary | ICD-10-CM

## 2025-05-05 DIAGNOSIS — R35.1 NOCTURIA: ICD-10-CM

## 2025-05-05 DIAGNOSIS — N39.41 URGE INCONTINENCE: ICD-10-CM

## 2025-05-05 PROCEDURE — 3288F FALL RISK ASSESSMENT DOCD: CPT | Mod: CPTII,HCNC,S$GLB, | Performed by: UROLOGY

## 2025-05-05 PROCEDURE — 99999 PR PBB SHADOW E&M-EST. PATIENT-LVL III: CPT | Mod: PBBFAC,HCNC,, | Performed by: UROLOGY

## 2025-05-05 PROCEDURE — 1160F RVW MEDS BY RX/DR IN RCRD: CPT | Mod: CPTII,HCNC,S$GLB, | Performed by: UROLOGY

## 2025-05-05 PROCEDURE — 99214 OFFICE O/P EST MOD 30 MIN: CPT | Mod: HCNC,S$GLB,, | Performed by: UROLOGY

## 2025-05-05 PROCEDURE — 1101F PT FALLS ASSESS-DOCD LE1/YR: CPT | Mod: CPTII,HCNC,S$GLB, | Performed by: UROLOGY

## 2025-05-05 PROCEDURE — 1159F MED LIST DOCD IN RCRD: CPT | Mod: CPTII,HCNC,S$GLB, | Performed by: UROLOGY

## 2025-05-05 RX ORDER — TROSPIUM CHLORIDE ER 60 MG/1
60 CAPSULE ORAL DAILY
Qty: 30 CAPSULE | Refills: 11 | Status: SHIPPED | OUTPATIENT
Start: 2025-05-05 | End: 2026-05-05

## 2025-05-05 NOTE — PROGRESS NOTES
Subjective:       Thad Maki is a 84 y.o. female who is an established patient who was referred by Dr. Guerrero Lino  for evaluation of frequency. [employer] services used for visit.     She reports urinary frequency, mainly nocturia x 2-3. Present x 3-4 yrs. She reports urgency with UUI. Denies nocturnal enuresis. No prior attempts at treatment. Denies dysuria, hematuria, UTIs. Denies LESLIE. Denies d/c.     PVR (bladder scan) today - 49cc (not true PVR, unable to void)    5/5/2025  Given trial Myrbetriq - no change in symptoms. Nocturia x 3, still with UUI at night. Denies bothersome daytime symptoms. Denies LE edema or PILAR. Occasional snoring. Drinking mainly water, admits to drinking a lot. Drinks up until 7pm, small amount water to take pill at 9-10pm.      The following portions of the patient's history were reviewed and updated as appropriate: allergies, current medications, past family history, past medical history, past social history, past surgical history and problem list.    Review of Systems  12 point review of systems completed. Pertinent positive and negatives listed in HPI        Objective:    Vitals: Wt 87.3 kg (192 lb 7.4 oz)   BMI 35.20 kg/m²     Physical Exam   General: well developed, well nourished in no acute distress  Head: normocephalic, atraumatic  Neck: no obvious enlargement of thyroid  HEENT: EOMI, mucus membranes moist, sclera anicteric, no hearing impairment  Lungs: symmetric expansion, non-labored breathing  Neuro: alert and oriented x 3, no gross deficits  Psych: normal judgment and insight, normal mood/affect and non-anxious  Genitourinary:   deferred      Lab Review   Urine analysis today in clinic shows - unable to void     Lab Results   Component Value Date    WBC 7.51 02/19/2025    HGB 14.1 02/19/2025    HCT 44.1 02/19/2025    MCV 87 02/19/2025     02/19/2025     Lab Results   Component Value Date    CREATININE 0.8 02/19/2025    BUN 13 02/19/2025        Imaging  NA         Assessment/Plan:      1. OAB (overactive bladder)    - Myrbetriq. Discussed poss SE. No significant improvement.   - Sanctura trial     2. Urge incontinence    - UUI: Behavioral changes, PFPT, anticholinergic medication, b-agonist medication. Botox/sacral neuromodulation for refractory UUI.     3. Nocturia    - Reduce PM fluids   - Myrbetriq - no change   - Sanctura trial. Discussed SE.    - Multifactorial etiology   - May need bladder diary        Follow up in 2 months w PVR

## 2025-05-09 NOTE — PROGRESS NOTES
"Physical Therapy Visit    Patient Name: Thad Maki  MRN: [id number]  YOB: 1940  Encounter Date: 4/28/2025    Therapy Diagnosis:   Encounter Diagnosis   Name Primary?    Impaired functional mobility and activity tolerance Yes     Physician: Guerrero Lino MD    Physician Orders: Eval and Treat  Medical Diagnosis: Gait instability    Visit # / Visits Authorized:  3 / 12  Insurance Authorization Period: 3/25/2025 to 12/31/2025  Date of Evaluation: 3/25/25  Plan of Care Certification: 3/25/25 to 6/25/25     Time In: 1000   Time Out: 1100  Total Time: 60   Total Billable Time: 23         Subjective   Reports continued soreness after treatment, but better than last time. She feels a little better..  Pain reported as 4/10. generalized lumbar spine    Objective            Treatment:  Therapeutic Exercise  TE 1: LTR 3x10  TE 2: HL hip adduction with ball 3x10x3"  TE 3: HL clams RTB 3x10x3"  TE 4: Bridges RTB 3x10  TE 5: Seated marches 3x10 ea  TE 6: LAQ 3x10  Balance/Neuromuscular Re-Education  NMR 1: PPT 3x10x5"  NMR 2: TA 3x10x5"  NMR 3: Supine marches 3x10x3"  NMR 4: Bridging 3x10x3"  NMR 5: BKFO RTB 3x10x3"  Therapeutic Activity  TA 1: Nustep 10'    Time Entry(in minutes):  Neuromuscular Re-Education Time Entry: 13  Therapeutic Activity Time Entry: 10    Assessment & Plan   Assessment: Continues to respond well to activities to improve lumbar stability and mobility. Will continue to progress.  Evaluation/Treatment Tolerance: Patient tolerated treatment well    Patient will continue to benefit from skilled outpatient physical therapy to address the deficits listed in the problem list box on initial evaluation, provide pt/family education and to maximize pt's level of independence in the home and community environment.     Patient's spiritual, cultural, and educational needs considered and patient agreeable to plan of care and goals.           Plan: Continue POC    Goals:   Active       " Ambulation/movement       Patient will walk 250 feet with rollator with Mod I (Progressing)       Start:  03/25/25    Expected End:  04/29/25               Functional outcome       Patient will show a significant change in FOTO patient-reported outcome tool to demonstrate subjective improvement (Progressing)       Start:  03/25/25    Expected End:  04/29/25            Patient stated goal: To be safer with ambulation  (Progressing)       Start:  03/25/25    Expected End:  04/29/25            Patient will demonstrate independence in home program for support of progression (Progressing)       Start:  03/25/25    Expected End:  04/29/25               Pain       Patient will report pain of 1/10 demonstrating a reduction of overall pain (Progressing)       Start:  03/25/25    Expected End:  04/29/25            Patient will report a 2 point reduction in pain while performing showering (Progressing)       Start:  03/25/25    Expected End:  04/29/25                Vasquez Adan, PT

## 2025-05-12 ENCOUNTER — CLINICAL SUPPORT (OUTPATIENT)
Dept: REHABILITATION | Facility: HOSPITAL | Age: 85
End: 2025-05-12
Payer: MEDICARE

## 2025-05-12 DIAGNOSIS — Z74.09 IMPAIRED FUNCTIONAL MOBILITY AND ACTIVITY TOLERANCE: Primary | ICD-10-CM

## 2025-05-12 PROCEDURE — 97110 THERAPEUTIC EXERCISES: CPT | Mod: HCNC,PN

## 2025-05-12 PROCEDURE — 97112 NEUROMUSCULAR REEDUCATION: CPT | Mod: HCNC,PN

## 2025-05-16 NOTE — PROGRESS NOTES
"Physical Therapy Progress Note    Patient Name: Thad Maki  MRN: [id number]  YOB: 1940  Encounter Date: 5/12/2025    Therapy Diagnosis:   Encounter Diagnosis   Name Primary?    Impaired functional mobility and activity tolerance Yes     Physician: Guerrero Lino MD    Physician Orders: Eval and Treat  Medical Diagnosis: Gait instability    Visit # / Visits Authorized:  4 / 12  Insurance Authorization Period: 3/25/2025 to 12/31/2025  Date of Evaluation: 3/25/2025  Plan of Care Certification: 3/25/2025 to 6/25/2025      PT/PTA: PT   Number of PTA visits since last PT visit:0  Time In: 1000   Time Out: 1100  Total Time (in minutes): 60   Total Billable Time (in minutes): 23    FOTO:  Intake Score:  %  Survey Score 2:  %  Survey Score 3:  %    Precautions:       Subjective   Soreness is improving..  Pain reported as 3/10. generalized lumbar spine    Objective            Treatment:  Therapeutic Exercise  TE 1: LTR 3x10  TE 2: HL hip adduction with ball 3x10x3"  TE 3: HL clams RTB 3x10x3"  TE 4: Bridges RTB 3x10  TE 5: Seated marches 3x10 ea  TE 6: LAQ 3x10  Balance/Neuromuscular Re-Education  NMR 1: PPT 3x10x5"  NMR 2: TA 3x10x5"  NMR 3: Supine marches 3x10x3"  NMR 4: Bridging 3x10x3"  NMR 5: BKFO RTB 3x10x3"  Therapeutic Activity  TA 1: Nustep 10'  TA 3: STS 20" 3x10    Time Entry(in minutes):  Neuromuscular Re-Education Time Entry: 13  Therapeutic Exercise Time Entry: 10    Assessment & Plan   Assessment: Continues to respond well to activities to improve lumbar stability and mobility. Will continue to progress.  Evaluation/Treatment Tolerance: Patient tolerated treatment well    Patient will continue to benefit from skilled outpatient physical therapy to address the deficits listed in the problem list box on initial evaluation, provide pt/family education and to maximize pt's level of independence in the home and community environment.     Patient's spiritual, cultural, and educational needs " considered and patient agreeable to plan of care and goals.           Plan: Continue POC    Goals:   Active       Ambulation/movement       Patient will walk 250 feet with rollator with Mod I (Progressing)       Start:  03/25/25    Expected End:  04/29/25               Functional outcome       Patient will show a significant change in FOTO patient-reported outcome tool to demonstrate subjective improvement (Progressing)       Start:  03/25/25    Expected End:  04/29/25            Patient stated goal: To be safer with ambulation  (Progressing)       Start:  03/25/25    Expected End:  04/29/25            Patient will demonstrate independence in home program for support of progression (Progressing)       Start:  03/25/25    Expected End:  04/29/25               Pain       Patient will report pain of 1/10 demonstrating a reduction of overall pain (Progressing)       Start:  03/25/25    Expected End:  04/29/25            Patient will report a 2 point reduction in pain while performing showering (Progressing)       Start:  03/25/25    Expected End:  04/29/25                Vasquez Adan, PT

## 2025-05-19 ENCOUNTER — CLINICAL SUPPORT (OUTPATIENT)
Dept: REHABILITATION | Facility: HOSPITAL | Age: 85
End: 2025-05-19
Payer: MEDICARE

## 2025-05-19 DIAGNOSIS — Z74.09 IMPAIRED FUNCTIONAL MOBILITY AND ACTIVITY TOLERANCE: Primary | ICD-10-CM

## 2025-05-19 PROCEDURE — 97110 THERAPEUTIC EXERCISES: CPT | Mod: HCNC,PN

## 2025-06-02 ENCOUNTER — OFFICE VISIT (OUTPATIENT)
Dept: FAMILY MEDICINE | Facility: CLINIC | Age: 85
End: 2025-06-02
Payer: MEDICARE

## 2025-06-02 VITALS
HEIGHT: 62 IN | WEIGHT: 190.94 LBS | SYSTOLIC BLOOD PRESSURE: 116 MMHG | BODY MASS INDEX: 35.14 KG/M2 | DIASTOLIC BLOOD PRESSURE: 60 MMHG | OXYGEN SATURATION: 99 % | HEART RATE: 99 BPM

## 2025-06-02 DIAGNOSIS — F41.1 GENERALIZED ANXIETY DISORDER: Primary | ICD-10-CM

## 2025-06-02 DIAGNOSIS — F33.1 MODERATE EPISODE OF RECURRENT MAJOR DEPRESSIVE DISORDER: ICD-10-CM

## 2025-06-02 DIAGNOSIS — Z12.31 BREAST CANCER SCREENING BY MAMMOGRAM: ICD-10-CM

## 2025-06-02 PROCEDURE — 1159F MED LIST DOCD IN RCRD: CPT | Mod: CPTII,S$GLB,, | Performed by: INTERNAL MEDICINE

## 2025-06-02 PROCEDURE — 3074F SYST BP LT 130 MM HG: CPT | Mod: CPTII,S$GLB,, | Performed by: INTERNAL MEDICINE

## 2025-06-02 PROCEDURE — 99214 OFFICE O/P EST MOD 30 MIN: CPT | Mod: S$GLB,,, | Performed by: INTERNAL MEDICINE

## 2025-06-02 PROCEDURE — 1160F RVW MEDS BY RX/DR IN RCRD: CPT | Mod: CPTII,S$GLB,, | Performed by: INTERNAL MEDICINE

## 2025-06-02 PROCEDURE — 99999 PR PBB SHADOW E&M-EST. PATIENT-LVL V: CPT | Mod: PBBFAC,,, | Performed by: INTERNAL MEDICINE

## 2025-06-02 PROCEDURE — 3078F DIAST BP <80 MM HG: CPT | Mod: CPTII,S$GLB,, | Performed by: INTERNAL MEDICINE

## 2025-06-02 PROCEDURE — 1101F PT FALLS ASSESS-DOCD LE1/YR: CPT | Mod: CPTII,S$GLB,, | Performed by: INTERNAL MEDICINE

## 2025-06-02 PROCEDURE — 3288F FALL RISK ASSESSMENT DOCD: CPT | Mod: CPTII,S$GLB,, | Performed by: INTERNAL MEDICINE

## 2025-06-02 PROCEDURE — 1125F AMNT PAIN NOTED PAIN PRSNT: CPT | Mod: CPTII,S$GLB,, | Performed by: INTERNAL MEDICINE

## 2025-06-23 NOTE — PROGRESS NOTES
"Physical Therapy Progress Note    Patient Name: Thad Maki  MRN: [id number]  YOB: 1940  Encounter Date: 5/19/2025    Therapy Diagnosis:   No diagnosis found.    Physician: Guerrero Lino MD    Physician Orders: Eval and Treat  Medical Diagnosis: Gait instability    Visit # / Visits Authorized:  5 / 12  Insurance Authorization Period: 3/25/2025 to 12/31/2025  Date of Evaluation: 3/25/2025  Plan of Care Certification: 3/25/2025 to 6/25/2025      PT/PTA:     Number of PTA visits since last PT visit:   Time In: 1000   Time Out: 1100  Total Time (in minutes): 60   Total Billable Time (in minutes): 23    FOTO:  Intake Score:  %  Survey Score 2:  %  Survey Score 3:  %    Precautions:       Subjective   No new issues today..  Pain reported as 3/10. generalized lumbar spine    Objective            Treatment:    Therapeutic Exercise  TE 1: LTR 3x10  TE 2: HL hip adduction with ball 3x10x3"  TE 3: HL clams RTB 3x10x3"  TE 4: Bridges RTB 3x10  TE 5: Seated marches 3x10 ea  TE 6: LAQ 3x10  Balance/Neuromuscular Re-Education  NMR 1: PPT 3x10x5"  NMR 2: TA 3x10x5"  NMR 3: Supine marches 3x10x3"  NMR 4: Bridging 3x10x3"  NMR 5: BKFO RTB 3x10x3"  Therapeutic Activity  TA 1: Nustep 10'  TA 3: STS 20" 3x10     Time Entry(in minutes):  Neuromuscular Re-Education Time Entry: 13  Therapeutic Exercise Time Entry: 10    Assessment & Plan   Assessment: Thad tolerated treatment well today. Presents to clinic reporting no new issues. Responding well to interventions. Will continue to progress treatment per patient tolerance.    Evaluation/Treatment Tolerance: Patient tolerated treatment well    Patient will continue to benefit from skilled outpatient physical therapy to address the deficits listed in the problem list box on initial evaluation, provide pt/family education and to maximize pt's level of independence in the home and community environment.     Patient's spiritual, cultural, and educational needs " considered and patient agreeable to plan of care and goals.           Plan: Continue POC.    Goals:   Active       Ambulation/movement       Patient will walk 250 feet with rollator with Mod I (Progressing)       Start:  03/25/25    Expected End:  04/29/25               Functional outcome       Patient will show a significant change in FOTO patient-reported outcome tool to demonstrate subjective improvement (Progressing)       Start:  03/25/25    Expected End:  04/29/25            Patient stated goal: To be safer with ambulation  (Progressing)       Start:  03/25/25    Expected End:  04/29/25            Patient will demonstrate independence in home program for support of progression (Progressing)       Start:  03/25/25    Expected End:  04/29/25               Pain       Patient will report pain of 1/10 demonstrating a reduction of overall pain (Progressing)       Start:  03/25/25    Expected End:  04/29/25            Patient will report a 2 point reduction in pain while performing showering (Progressing)       Start:  03/25/25    Expected End:  04/29/25                Vasquez Adan, PT

## 2025-07-10 ENCOUNTER — OFFICE VISIT (OUTPATIENT)
Dept: UROLOGY | Facility: CLINIC | Age: 85
End: 2025-07-10
Payer: MEDICARE

## 2025-07-10 DIAGNOSIS — R35.1 NOCTURIA: ICD-10-CM

## 2025-07-10 DIAGNOSIS — N39.41 URGE INCONTINENCE: ICD-10-CM

## 2025-07-10 DIAGNOSIS — N32.81 OAB (OVERACTIVE BLADDER): Primary | ICD-10-CM

## 2025-07-10 PROCEDURE — 99214 OFFICE O/P EST MOD 30 MIN: CPT | Mod: HCNC,S$GLB,, | Performed by: UROLOGY

## 2025-07-10 PROCEDURE — 1100F PTFALLS ASSESS-DOCD GE2>/YR: CPT | Mod: CPTII,HCNC,S$GLB, | Performed by: UROLOGY

## 2025-07-10 PROCEDURE — 1160F RVW MEDS BY RX/DR IN RCRD: CPT | Mod: CPTII,HCNC,S$GLB, | Performed by: UROLOGY

## 2025-07-10 PROCEDURE — 99999 PR PBB SHADOW E&M-EST. PATIENT-LVL II: CPT | Mod: PBBFAC,HCNC,, | Performed by: UROLOGY

## 2025-07-10 PROCEDURE — 3288F FALL RISK ASSESSMENT DOCD: CPT | Mod: CPTII,HCNC,S$GLB, | Performed by: UROLOGY

## 2025-07-10 PROCEDURE — 1159F MED LIST DOCD IN RCRD: CPT | Mod: CPTII,HCNC,S$GLB, | Performed by: UROLOGY

## 2025-07-10 RX ORDER — DESMOPRESSIN ACETATE 0.1 MG/1
100 TABLET ORAL NIGHTLY
Qty: 30 TABLET | Refills: 11 | Status: SHIPPED | OUTPATIENT
Start: 2025-07-10 | End: 2026-07-10

## 2025-07-10 NOTE — PROGRESS NOTES
Subjective:       Thad Maki is a 84 y.o. female who is an established patient who was referred by Dr. Guerrero Lino  for evaluation of frequency. [facility] used for visit.     She reports urinary frequency, mainly nocturia x 2-3. Present x 3-4 yrs. She reports urgency with UUI. Denies nocturnal enuresis. No prior attempts at treatment. Denies dysuria, hematuria, UTIs. Denies LESLIE. Denies d/c.     PVR (bladder scan) today - 49cc (not true PVR, unable to void)    5/5/2025  Given trial Myrbetriq - no change in symptoms. Nocturia x 3, still with UUI at night. Denies bothersome daytime symptoms. Denies LE edema or PILAR. Occasional snoring. Drinking mainly water, admits to drinking a lot. Drinks up until 7pm, small amount water to take pill at 9-10pm.    7/10/2025  Trial Sanctura 60mg QD - no change in symptoms. Still with nocturia x 3, with UUI at night. Reports she does not void much during day. No UUI during day.   Recent fall/knee pain.   PVR (bladder scan) today - 10cc      The following portions of the patient's history were reviewed and updated as appropriate: allergies, current medications, past family history, past medical history, past social history, past surgical history and problem list.    Review of Systems  12 point review of systems completed. Pertinent positive and negatives listed in HPI        Objective:    Vitals: There were no vitals taken for this visit.    Physical Exam   General: well developed, well nourished in no acute distress  Head: normocephalic, atraumatic  Neck: no obvious enlargement of thyroid  HEENT: EOMI, mucus membranes moist, sclera anicteric, no hearing impairment  Lungs: symmetric expansion, non-labored breathing  Neuro: alert and oriented x 3, no gross deficits  Psych: normal judgment and insight, normal mood/affect and non-anxious  Genitourinary: deferred      Lab Review   Urine analysis today in clinic shows - unable to void     Lab Results    Component Value Date    WBC 7.51 02/19/2025    HGB 14.1 02/19/2025    HCT 44.1 02/19/2025    MCV 87 02/19/2025     02/19/2025     Lab Results   Component Value Date    CREATININE 0.8 02/19/2025    BUN 13 02/19/2025       Imaging  NA         Assessment/Plan:      1. OAB (overactive bladder)    - Myrbetriq. Discussed poss SE. No significant improvement.   - Sanctura trial - no change     2. Urge incontinence    - UUI: Behavioral changes, PFPT, anticholinergic medication, b-agonist medication. Botox/sacral neuromodulation for refractory UUI.     3. Nocturia    - Reduce PM fluids   - Myrbetriq - no change   - Sanctura trial - no change   - Multifactorial etiology   - May need bladder diary - discussed   - Discussed DDAVP. Need for monitoring of BMP. Risk of hypoNa. She would like to trial this.    - Not interested in any procedure/surgery for OAB/nocturia       Follow up in 2 weeks with BMP

## 2025-08-20 ENCOUNTER — TELEPHONE (OUTPATIENT)
Dept: FAMILY MEDICINE | Facility: CLINIC | Age: 85
End: 2025-08-20
Payer: MEDICARE

## 2025-08-22 ENCOUNTER — HOSPITAL ENCOUNTER (EMERGENCY)
Facility: HOSPITAL | Age: 85
Discharge: HOME | End: 2025-08-22
Attending: EMERGENCY MEDICINE
Payer: MEDICARE

## 2025-08-22 VITALS
WEIGHT: 190 LBS | HEIGHT: 62 IN | SYSTOLIC BLOOD PRESSURE: 132 MMHG | OXYGEN SATURATION: 97 % | HEART RATE: 67 BPM | BODY MASS INDEX: 34.96 KG/M2 | RESPIRATION RATE: 18 BRPM | TEMPERATURE: 98 F | DIASTOLIC BLOOD PRESSURE: 68 MMHG

## 2025-08-22 DIAGNOSIS — M25.562 LEFT KNEE PAIN: Primary | ICD-10-CM
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
## 2025-08-22 DIAGNOSIS — S20.212A RIB CONTUSION, LEFT, INITIAL ENCOUNTER: ICD-10-CM

## 2025-08-22 PROCEDURE — 99284 EMERGENCY DEPT VISIT MOD MDM: CPT | Mod: 25,HCNC

## 2025-08-22 PROCEDURE — 25000003 PHARM REV CODE 250: Mod: HCNC

## 2025-08-22 RX ORDER — IBUPROFEN 600 MG/1
600 TABLET, FILM COATED ORAL
Status: COMPLETED | OUTPATIENT
Start: 2025-08-22 | End: 2025-08-22

## 2025-08-22 RX ORDER — IBUPROFEN 600 MG/1
600 TABLET, FILM COATED ORAL EVERY 6 HOURS PRN
Qty: 20 TABLET | Refills: 0 | Status: SHIPPED | OUTPATIENT
Start: 2025-08-22

## 2025-08-22 RX ADMIN — IBUPROFEN 600 MG: 600 TABLET ORAL at 04:08

## 2025-08-25 ENCOUNTER — PATIENT OUTREACH (OUTPATIENT)
Facility: OTHER | Age: 85
End: 2025-08-25
Payer: MEDICARE

## 2025-09-03 ENCOUNTER — OFFICE VISIT (OUTPATIENT)
Dept: FAMILY MEDICINE | Facility: CLINIC | Age: 85
End: 2025-09-03
Payer: MEDICARE

## 2025-09-03 VITALS
WEIGHT: 190.69 LBS | OXYGEN SATURATION: 97 % | HEIGHT: 62 IN | BODY MASS INDEX: 35.09 KG/M2 | TEMPERATURE: 98 F | SYSTOLIC BLOOD PRESSURE: 132 MMHG | HEART RATE: 83 BPM | DIASTOLIC BLOOD PRESSURE: 78 MMHG

## 2025-09-03 DIAGNOSIS — R79.9 ABNORMAL FINDING OF BLOOD CHEMISTRY, UNSPECIFIED: ICD-10-CM

## 2025-09-03 DIAGNOSIS — E03.9 ACQUIRED HYPOTHYROIDISM: ICD-10-CM

## 2025-09-03 DIAGNOSIS — M79.606 PAIN OF LOWER EXTREMITY, UNSPECIFIED LATERALITY: Primary | ICD-10-CM
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
## 2025-09-03 DIAGNOSIS — E66.811 CLASS 1 OBESITY DUE TO EXCESS CALORIES WITH SERIOUS COMORBIDITY AND BODY MASS INDEX (BMI) OF 34.0 TO 34.9 IN ADULT: ICD-10-CM

## 2025-09-03 DIAGNOSIS — Z00.00 HEALTHCARE MAINTENANCE: ICD-10-CM

## 2025-09-03 DIAGNOSIS — E66.01 SEVERE OBESITY (BMI 35.0-39.9) WITH COMORBIDITY: ICD-10-CM

## 2025-09-03 DIAGNOSIS — E78.2 MIXED HYPERLIPIDEMIA: ICD-10-CM

## 2025-09-03 DIAGNOSIS — R60.1 GENERALIZED EDEMA: ICD-10-CM

## 2025-09-03 DIAGNOSIS — E66.09 CLASS 1 OBESITY DUE TO EXCESS CALORIES WITH SERIOUS COMORBIDITY AND BODY MASS INDEX (BMI) OF 34.0 TO 34.9 IN ADULT: ICD-10-CM

## 2025-09-03 DIAGNOSIS — Z51.81 MEDICATION MONITORING ENCOUNTER: ICD-10-CM

## 2025-09-03 PROCEDURE — 99999 PR PBB SHADOW E&M-EST. PATIENT-LVL V: CPT | Mod: PBBFAC,HCNC,, | Performed by: INTERNAL MEDICINE

## 2025-09-03 PROCEDURE — 99214 OFFICE O/P EST MOD 30 MIN: CPT | Mod: HCNC,S$GLB,, | Performed by: INTERNAL MEDICINE

## 2025-09-03 PROCEDURE — 1160F RVW MEDS BY RX/DR IN RCRD: CPT | Mod: CPTII,HCNC,S$GLB, | Performed by: INTERNAL MEDICINE

## 2025-09-03 PROCEDURE — 1125F AMNT PAIN NOTED PAIN PRSNT: CPT | Mod: CPTII,HCNC,S$GLB, | Performed by: INTERNAL MEDICINE

## 2025-09-03 PROCEDURE — 1159F MED LIST DOCD IN RCRD: CPT | Mod: CPTII,HCNC,S$GLB, | Performed by: INTERNAL MEDICINE

## 2025-09-03 PROCEDURE — 3075F SYST BP GE 130 - 139MM HG: CPT | Mod: CPTII,HCNC,S$GLB, | Performed by: INTERNAL MEDICINE

## 2025-09-03 PROCEDURE — 1100F PTFALLS ASSESS-DOCD GE2>/YR: CPT | Mod: CPTII,HCNC,S$GLB, | Performed by: INTERNAL MEDICINE

## 2025-09-03 PROCEDURE — 3078F DIAST BP <80 MM HG: CPT | Mod: CPTII,HCNC,S$GLB, | Performed by: INTERNAL MEDICINE

## 2025-09-03 PROCEDURE — 3288F FALL RISK ASSESSMENT DOCD: CPT | Mod: CPTII,HCNC,S$GLB, | Performed by: INTERNAL MEDICINE

## 2025-09-04 ENCOUNTER — TELEPHONE (OUTPATIENT)
Dept: FAMILY MEDICINE | Facility: CLINIC | Age: 85
End: 2025-09-04
Payer: MEDICARE

## 2025-09-05 ENCOUNTER — TELEPHONE (OUTPATIENT)
Dept: FAMILY MEDICINE | Facility: CLINIC | Age: 85
End: 2025-09-05
Payer: MEDICARE